# Patient Record
Sex: MALE | Race: OTHER | HISPANIC OR LATINO | Employment: OTHER | URBAN - METROPOLITAN AREA
[De-identification: names, ages, dates, MRNs, and addresses within clinical notes are randomized per-mention and may not be internally consistent; named-entity substitution may affect disease eponyms.]

---

## 2023-05-16 LAB
ALANINE AMINOTRANSFERASE (SGPT) (U/L) IN SER/PLAS: 15 U/L (ref 10–52)
ALBUMIN (G/DL) IN SER/PLAS: 4.4 G/DL (ref 3.4–5)
ALKALINE PHOSPHATASE (U/L) IN SER/PLAS: 71 U/L (ref 33–136)
ANION GAP IN SER/PLAS: 9 MMOL/L (ref 10–20)
ASPARTATE AMINOTRANSFERASE (SGOT) (U/L) IN SER/PLAS: 20 U/L (ref 9–39)
BASOPHILS (10*3/UL) IN BLOOD BY AUTOMATED COUNT: 0.01 X10E9/L (ref 0–0.1)
BASOPHILS/100 LEUKOCYTES IN BLOOD BY AUTOMATED COUNT: 0.2 % (ref 0–2)
BILIRUBIN TOTAL (MG/DL) IN SER/PLAS: 2 MG/DL (ref 0–1.2)
CALCIUM (MG/DL) IN SER/PLAS: 9.2 MG/DL (ref 8.6–10.3)
CARBON DIOXIDE, TOTAL (MMOL/L) IN SER/PLAS: 30 MMOL/L (ref 21–32)
CHLORIDE (MMOL/L) IN SER/PLAS: 102 MMOL/L (ref 98–107)
CHOLESTEROL (MG/DL) IN SER/PLAS: 124 MG/DL (ref 0–199)
CHOLESTEROL IN HDL (MG/DL) IN SER/PLAS: 41.4 MG/DL
CHOLESTEROL/HDL RATIO: 3
CREATININE (MG/DL) IN SER/PLAS: 0.95 MG/DL (ref 0.5–1.3)
EOSINOPHILS (10*3/UL) IN BLOOD BY AUTOMATED COUNT: 0.08 X10E9/L (ref 0–0.4)
EOSINOPHILS/100 LEUKOCYTES IN BLOOD BY AUTOMATED COUNT: 1.3 % (ref 0–6)
ERYTHROCYTE DISTRIBUTION WIDTH (RATIO) BY AUTOMATED COUNT: 12.9 % (ref 11.5–14.5)
ERYTHROCYTE MEAN CORPUSCULAR HEMOGLOBIN CONCENTRATION (G/DL) BY AUTOMATED: 34.1 G/DL (ref 32–36)
ERYTHROCYTE MEAN CORPUSCULAR VOLUME (FL) BY AUTOMATED COUNT: 87 FL (ref 80–100)
ERYTHROCYTES (10*6/UL) IN BLOOD BY AUTOMATED COUNT: 5.26 X10E12/L (ref 4.5–5.9)
GFR MALE: 85 ML/MIN/1.73M2
GLUCOSE (MG/DL) IN SER/PLAS: 103 MG/DL (ref 74–99)
HEMATOCRIT (%) IN BLOOD BY AUTOMATED COUNT: 45.8 % (ref 41–52)
HEMOGLOBIN (G/DL) IN BLOOD: 15.6 G/DL (ref 13.5–17.5)
IMMATURE GRANULOCYTES/100 LEUKOCYTES IN BLOOD BY AUTOMATED COUNT: 0.2 % (ref 0–0.9)
LDL: 60 MG/DL (ref 0–99)
LEUKOCYTES (10*3/UL) IN BLOOD BY AUTOMATED COUNT: 6.4 X10E9/L (ref 4.4–11.3)
LYMPHOCYTES (10*3/UL) IN BLOOD BY AUTOMATED COUNT: 1.97 X10E9/L (ref 0.8–3)
LYMPHOCYTES/100 LEUKOCYTES IN BLOOD BY AUTOMATED COUNT: 30.9 % (ref 13–44)
MONOCYTES (10*3/UL) IN BLOOD BY AUTOMATED COUNT: 0.55 X10E9/L (ref 0.05–0.8)
MONOCYTES/100 LEUKOCYTES IN BLOOD BY AUTOMATED COUNT: 8.6 % (ref 2–10)
NATRIURETIC PEPTIDE B (PG/ML) IN SER/PLAS: 29 PG/ML (ref 0–99)
NEUTROPHILS (10*3/UL) IN BLOOD BY AUTOMATED COUNT: 3.76 X10E9/L (ref 1.6–5.5)
NEUTROPHILS/100 LEUKOCYTES IN BLOOD BY AUTOMATED COUNT: 58.8 % (ref 40–80)
PLATELETS (10*3/UL) IN BLOOD AUTOMATED COUNT: 165 X10E9/L (ref 150–450)
POTASSIUM (MMOL/L) IN SER/PLAS: 4.3 MMOL/L (ref 3.5–5.3)
PROTEIN TOTAL: 7.2 G/DL (ref 6.4–8.2)
SODIUM (MMOL/L) IN SER/PLAS: 137 MMOL/L (ref 136–145)
THYROTROPIN (MIU/L) IN SER/PLAS BY DETECTION LIMIT <= 0.05 MIU/L: 5.07 MIU/L (ref 0.44–3.98)
THYROXINE (T4) FREE (NG/DL) IN SER/PLAS: 1.09 NG/DL (ref 0.61–1.12)
TRIGLYCERIDE (MG/DL) IN SER/PLAS: 114 MG/DL (ref 0–149)
UREA NITROGEN (MG/DL) IN SER/PLAS: 11 MG/DL (ref 6–23)
VLDL: 23 MG/DL (ref 0–40)

## 2023-10-02 PROBLEM — Z86.79 HISTORY OF ORTHOSTATIC HYPOTENSION: Status: ACTIVE | Noted: 2023-10-02

## 2023-10-02 PROBLEM — G20.A1 PARKINSONS DISEASE (MULTI): Status: ACTIVE | Noted: 2023-10-02

## 2023-10-02 PROBLEM — F32.5 DEPRESSION, MAJOR, IN REMISSION (CMS-HCC): Status: ACTIVE | Noted: 2023-10-02

## 2023-10-02 PROBLEM — K63.5 COLON POLYPS: Status: ACTIVE | Noted: 2023-10-02

## 2023-10-02 PROBLEM — R35.1 NOCTURIA: Status: ACTIVE | Noted: 2023-10-02

## 2023-10-02 PROBLEM — N40.1 BPH WITH OBSTRUCTION/LOWER URINARY TRACT SYMPTOMS: Status: ACTIVE | Noted: 2023-10-02

## 2023-10-02 PROBLEM — G47.30 SLEEP APNEA: Status: ACTIVE | Noted: 2023-10-02

## 2023-10-02 PROBLEM — Z78.9 NEVER SMOKED ANY SUBSTANCE: Status: ACTIVE | Noted: 2023-10-02

## 2023-10-02 PROBLEM — R00.1 SINUS BRADYCARDIA: Status: ACTIVE | Noted: 2023-10-02

## 2023-10-02 PROBLEM — E66.812 CLASS 2 OBESITY WITH ALVEOLAR HYPOVENTILATION AND BODY MASS INDEX (BMI) OF 36.0 TO 36.9 IN ADULT: Status: ACTIVE | Noted: 2023-10-02

## 2023-10-02 PROBLEM — H91.90 HEARING LOSS: Status: ACTIVE | Noted: 2023-10-02

## 2023-10-02 PROBLEM — R07.89 ATYPICAL CHEST PAIN: Status: ACTIVE | Noted: 2023-10-02

## 2023-10-02 PROBLEM — E66.2 CLASS 2 OBESITY WITH ALVEOLAR HYPOVENTILATION AND BODY MASS INDEX (BMI) OF 37.0 TO 37.9 IN ADULT (MULTI): Status: ACTIVE | Noted: 2023-10-02

## 2023-10-02 PROBLEM — E80.6 HYPERBILIRUBINEMIA: Status: ACTIVE | Noted: 2023-10-02

## 2023-10-02 PROBLEM — E03.9 HYPOTHYROIDISM: Status: ACTIVE | Noted: 2023-10-02

## 2023-10-02 PROBLEM — N13.8 BPH WITH OBSTRUCTION/LOWER URINARY TRACT SYMPTOMS: Status: ACTIVE | Noted: 2023-10-02

## 2023-10-02 PROBLEM — E66.2 CLASS 2 OBESITY WITH ALVEOLAR HYPOVENTILATION AND BODY MASS INDEX (BMI) OF 35.0 TO 35.9 IN ADULT (MULTI): Status: ACTIVE | Noted: 2023-10-02

## 2023-10-02 PROBLEM — E04.1 SOLITARY THYROID NODULE: Status: ACTIVE | Noted: 2023-10-02

## 2023-10-02 PROBLEM — E78.5 HYPERLIPIDEMIA: Status: ACTIVE | Noted: 2023-10-02

## 2023-10-02 PROBLEM — I73.9 INTERMITTENT CLAUDICATION (CMS-HCC): Status: ACTIVE | Noted: 2023-10-02

## 2023-10-02 PROBLEM — E66.2 CLASS 2 OBESITY WITH ALVEOLAR HYPOVENTILATION AND BODY MASS INDEX (BMI) OF 36.0 TO 36.9 IN ADULT (MULTI): Status: ACTIVE | Noted: 2023-10-02

## 2023-10-02 PROBLEM — I87.2 CHRONIC VENOUS INSUFFICIENCY: Status: ACTIVE | Noted: 2023-10-02

## 2023-10-02 PROBLEM — E55.9 VITAMIN D DEFICIENCY: Status: ACTIVE | Noted: 2023-10-02

## 2023-10-02 PROBLEM — R06.02 SHORTNESS OF BREATH: Status: ACTIVE | Noted: 2023-10-02

## 2023-10-02 PROBLEM — R73.9 HYPERGLYCEMIA: Status: ACTIVE | Noted: 2023-10-02

## 2023-10-02 PROBLEM — E66.812 CLASS 2 OBESITY WITH ALVEOLAR HYPOVENTILATION AND BODY MASS INDEX (BMI) OF 37.0 TO 37.9 IN ADULT: Status: ACTIVE | Noted: 2023-10-02

## 2023-10-02 PROBLEM — R31.29 MICROSCOPIC HEMATURIA: Status: ACTIVE | Noted: 2023-10-02

## 2023-10-02 PROBLEM — E66.812 CLASS 2 OBESITY WITH ALVEOLAR HYPOVENTILATION AND BODY MASS INDEX (BMI) OF 35.0 TO 35.9 IN ADULT: Status: ACTIVE | Noted: 2023-10-02

## 2023-10-02 PROBLEM — I10 HTN (HYPERTENSION): Status: ACTIVE | Noted: 2023-10-02

## 2023-10-02 PROBLEM — R60.0 EDEMA, LEG: Status: ACTIVE | Noted: 2023-10-02

## 2023-10-02 PROBLEM — D69.6 THROMBOCYTOPENIA (CMS-HCC): Status: ACTIVE | Noted: 2023-10-02

## 2023-10-02 PROBLEM — G62.9 PERIPHERAL POLYNEUROPATHY: Status: ACTIVE | Noted: 2023-10-02

## 2023-10-02 RX ORDER — POTASSIUM CHLORIDE 20 MEQ/1
1 TABLET, EXTENDED RELEASE ORAL DAILY
COMMUNITY
Start: 2021-02-12 | End: 2024-01-15

## 2023-10-02 RX ORDER — LEVOTHYROXINE SODIUM 25 UG/1
1 TABLET ORAL DAILY
COMMUNITY
Start: 2022-03-04 | End: 2024-02-14 | Stop reason: SDUPTHER

## 2023-10-02 RX ORDER — FLUTICASONE PROPIONATE 50 MCG
2 SPRAY, SUSPENSION (ML) NASAL DAILY
COMMUNITY
Start: 2017-11-16

## 2023-10-02 RX ORDER — SERTRALINE HYDROCHLORIDE 25 MG/1
1 TABLET, FILM COATED ORAL 2 TIMES DAILY
COMMUNITY

## 2023-10-02 RX ORDER — ERGOCALCIFEROL 1.25 MG/1
1 CAPSULE ORAL
COMMUNITY
Start: 2022-03-04 | End: 2024-02-14 | Stop reason: SDUPTHER

## 2023-10-02 RX ORDER — NITROGLYCERIN 0.4 MG/1
1 TABLET SUBLINGUAL EVERY 5 MIN PRN
COMMUNITY

## 2023-10-02 RX ORDER — CARBIDOPA AND LEVODOPA 25; 100 MG/1; MG/1
1.5 TABLET ORAL 4 TIMES DAILY
COMMUNITY
Start: 2021-06-17

## 2023-10-02 RX ORDER — AMLODIPINE BESYLATE 2.5 MG/1
1 TABLET ORAL DAILY
COMMUNITY
Start: 2022-04-11 | End: 2024-02-14 | Stop reason: SDUPTHER

## 2023-10-02 RX ORDER — AMLODIPINE BESYLATE 5 MG/1
1 TABLET ORAL DAILY
COMMUNITY
End: 2024-02-14 | Stop reason: ALTCHOICE

## 2023-10-02 RX ORDER — TAMSULOSIN HYDROCHLORIDE 0.4 MG/1
0.4 CAPSULE ORAL DAILY
COMMUNITY
End: 2024-05-13 | Stop reason: SDUPTHER

## 2023-10-02 RX ORDER — FUROSEMIDE 40 MG/1
1 TABLET ORAL DAILY
COMMUNITY
Start: 2022-01-12 | End: 2024-02-14 | Stop reason: ALTCHOICE

## 2023-10-02 RX ORDER — ROSUVASTATIN CALCIUM 10 MG/1
1 TABLET, COATED ORAL NIGHTLY
COMMUNITY
Start: 2016-02-12

## 2023-10-02 RX ORDER — LISINOPRIL 20 MG/1
1 TABLET ORAL DAILY
COMMUNITY
Start: 2018-06-18 | End: 2024-02-14 | Stop reason: SDUPTHER

## 2023-10-04 ENCOUNTER — ANCILLARY PROCEDURE (OUTPATIENT)
Dept: CARDIOLOGY | Facility: CLINIC | Age: 73
End: 2023-10-04
Payer: MEDICARE

## 2023-10-04 ENCOUNTER — LAB (OUTPATIENT)
Dept: LAB | Facility: LAB | Age: 73
End: 2023-10-04
Payer: MEDICARE

## 2023-10-04 DIAGNOSIS — R00.1 BRADYCARDIA, UNSPECIFIED: ICD-10-CM

## 2023-10-04 DIAGNOSIS — E03.9 HYPOTHYROIDISM, UNSPECIFIED: Primary | ICD-10-CM

## 2023-10-04 LAB — TSH SERPL-ACNC: 2.13 MIU/L (ref 0.44–3.98)

## 2023-10-04 PROCEDURE — 93224 XTRNL ECG REC UP TO 48 HRS: CPT | Performed by: INTERNAL MEDICINE

## 2023-10-04 PROCEDURE — 36415 COLL VENOUS BLD VENIPUNCTURE: CPT

## 2023-10-05 ENCOUNTER — TELEPHONE (OUTPATIENT)
Dept: PRIMARY CARE | Facility: CLINIC | Age: 73
End: 2023-10-05
Payer: MEDICARE

## 2024-02-14 ENCOUNTER — OFFICE VISIT (OUTPATIENT)
Dept: PRIMARY CARE | Facility: CLINIC | Age: 74
End: 2024-02-14
Payer: MEDICARE

## 2024-02-14 VITALS
HEART RATE: 45 BPM | HEIGHT: 67 IN | WEIGHT: 229 LBS | DIASTOLIC BLOOD PRESSURE: 69 MMHG | SYSTOLIC BLOOD PRESSURE: 126 MMHG | TEMPERATURE: 98 F | BODY MASS INDEX: 35.94 KG/M2

## 2024-02-14 DIAGNOSIS — M54.42 ACUTE LEFT-SIDED LOW BACK PAIN WITH LEFT-SIDED SCIATICA: Primary | ICD-10-CM

## 2024-02-14 DIAGNOSIS — N40.1 BPH WITH OBSTRUCTION/LOWER URINARY TRACT SYMPTOMS: ICD-10-CM

## 2024-02-14 DIAGNOSIS — I10 PRIMARY HYPERTENSION: ICD-10-CM

## 2024-02-14 DIAGNOSIS — R31.29 MICROSCOPIC HEMATURIA: ICD-10-CM

## 2024-02-14 DIAGNOSIS — E06.3 HYPOTHYROIDISM DUE TO HASHIMOTO'S THYROIDITIS: ICD-10-CM

## 2024-02-14 DIAGNOSIS — E78.2 MIXED HYPERLIPIDEMIA: ICD-10-CM

## 2024-02-14 DIAGNOSIS — N13.8 BPH WITH OBSTRUCTION/LOWER URINARY TRACT SYMPTOMS: ICD-10-CM

## 2024-02-14 DIAGNOSIS — E03.8 HYPOTHYROIDISM DUE TO HASHIMOTO'S THYROIDITIS: ICD-10-CM

## 2024-02-14 DIAGNOSIS — R35.1 NOCTURIA: ICD-10-CM

## 2024-02-14 DIAGNOSIS — E55.9 VITAMIN D DEFICIENCY: ICD-10-CM

## 2024-02-14 DIAGNOSIS — Z12.5 PROSTATE CANCER SCREENING: ICD-10-CM

## 2024-02-14 PROBLEM — M65.979 TENOSYNOVITIS OF ANKLE: Status: ACTIVE | Noted: 2024-02-14

## 2024-02-14 PROBLEM — R73.02 IMPAIRED GLUCOSE TOLERANCE (ORAL): Status: ACTIVE | Noted: 2024-02-14

## 2024-02-14 PROBLEM — M21.40 PES PLANUS: Status: ACTIVE | Noted: 2024-02-14

## 2024-02-14 PROBLEM — M54.50 LOW BACK PAIN, UNSPECIFIED: Status: ACTIVE | Noted: 2024-02-14

## 2024-02-14 PROBLEM — R26.9 UNSPECIFIED ABNORMALITIES OF GAIT AND MOBILITY: Status: ACTIVE | Noted: 2024-02-14

## 2024-02-14 PROBLEM — R60.0 LOCALIZED EDEMA: Status: ACTIVE | Noted: 2024-02-14

## 2024-02-14 PROBLEM — F51.01 PRIMARY INSOMNIA: Status: ACTIVE | Noted: 2024-02-14

## 2024-02-14 PROBLEM — L21.9 SEBORRHEIC DERMATITIS, UNSPECIFIED: Status: ACTIVE | Noted: 2024-02-14

## 2024-02-14 PROBLEM — G47.61 PERIODIC LIMB MOVEMENT DISORDER: Status: ACTIVE | Noted: 2024-02-14

## 2024-02-14 PROBLEM — M75.41 IMPINGEMENT SYNDROME OF RIGHT SHOULDER: Status: ACTIVE | Noted: 2024-02-14

## 2024-02-14 PROBLEM — M14.80: Status: ACTIVE | Noted: 2024-02-14

## 2024-02-14 PROBLEM — E87.6 HYPOKALEMIA: Status: ACTIVE | Noted: 2024-02-14

## 2024-02-14 PROBLEM — Z20.822 CONTACT WITH AND (SUSPECTED) EXPOSURE TO COVID-19: Status: ACTIVE | Noted: 2024-02-14

## 2024-02-14 PROBLEM — F33.2 MAJOR DEPRESSIVE DISORDER, RECURRENT SEVERE WITHOUT PSYCHOTIC FEATURES (MULTI): Status: ACTIVE | Noted: 2024-02-14

## 2024-02-14 PROBLEM — H02.051 TRICHIASIS WITHOUT ENTROPION RIGHT UPPER EYELID: Status: ACTIVE | Noted: 2024-02-14

## 2024-02-14 PROBLEM — G90.9 AUTONOMIC NEUROPATHY: Status: ACTIVE | Noted: 2024-02-14

## 2024-02-14 PROBLEM — L29.9 PRURITUS, UNSPECIFIED: Status: ACTIVE | Noted: 2024-02-14

## 2024-02-14 PROBLEM — M65.9 TENOSYNOVITIS OF ANKLE: Status: ACTIVE | Noted: 2024-02-14

## 2024-02-14 PROBLEM — E11.49 TYPE 2 DIABETES MELLITUS WITH OTHER DIABETIC NEUROLOGICAL COMPLICATION (MULTI): Status: ACTIVE | Noted: 2024-02-14

## 2024-02-14 PROBLEM — I25.10 ARTERIOSCLEROSIS OF CORONARY ARTERY: Status: ACTIVE | Noted: 2024-02-14

## 2024-02-14 PROBLEM — M65.871 OTHER SYNOVITIS AND TENOSYNOVITIS, RIGHT ANKLE AND FOOT: Status: ACTIVE | Noted: 2024-02-14

## 2024-02-14 PROBLEM — H11.823 CONJUNCTIVOCHALASIS, BILATERAL: Status: ACTIVE | Noted: 2024-02-14

## 2024-02-14 PROBLEM — Q66.50 CONGENITAL PES PLANUS, UNSPECIFIED FOOT: Status: ACTIVE | Noted: 2024-02-14

## 2024-02-14 PROBLEM — R73.03 PREDIABETES: Status: ACTIVE | Noted: 2024-02-14

## 2024-02-14 PROBLEM — F41.9 ANXIETY DISORDER, UNSPECIFIED: Status: ACTIVE | Noted: 2024-02-14

## 2024-02-14 PROBLEM — B35.1 TINEA UNGUIUM: Status: ACTIVE | Noted: 2024-02-14

## 2024-02-14 PROBLEM — R60.0 BILATERAL LOWER EXTREMITY EDEMA: Status: ACTIVE | Noted: 2024-02-14

## 2024-02-14 PROCEDURE — 99214 OFFICE O/P EST MOD 30 MIN: CPT | Performed by: INTERNAL MEDICINE

## 2024-02-14 PROCEDURE — 1159F MED LIST DOCD IN RCRD: CPT | Performed by: INTERNAL MEDICINE

## 2024-02-14 PROCEDURE — 3074F SYST BP LT 130 MM HG: CPT | Performed by: INTERNAL MEDICINE

## 2024-02-14 PROCEDURE — 1036F TOBACCO NON-USER: CPT | Performed by: INTERNAL MEDICINE

## 2024-02-14 PROCEDURE — 96372 THER/PROPH/DIAG INJ SC/IM: CPT | Performed by: INTERNAL MEDICINE

## 2024-02-14 PROCEDURE — 1160F RVW MEDS BY RX/DR IN RCRD: CPT | Performed by: INTERNAL MEDICINE

## 2024-02-14 PROCEDURE — 4010F ACE/ARB THERAPY RXD/TAKEN: CPT | Performed by: INTERNAL MEDICINE

## 2024-02-14 PROCEDURE — 3078F DIAST BP <80 MM HG: CPT | Performed by: INTERNAL MEDICINE

## 2024-02-14 RX ORDER — METHYLPREDNISOLONE ACETATE 80 MG/ML
120 INJECTION, SUSPENSION INTRA-ARTICULAR; INTRALESIONAL; INTRAMUSCULAR; SOFT TISSUE ONCE
Qty: 1.5 ML | Refills: 0 | Status: SHIPPED | OUTPATIENT
Start: 2024-02-14 | End: 2024-02-14

## 2024-02-14 RX ORDER — ERGOCALCIFEROL 1.25 MG/1
1 CAPSULE ORAL
Qty: 12 CAPSULE | Refills: 3 | Status: SHIPPED | OUTPATIENT
Start: 2024-02-14 | End: 2025-02-13

## 2024-02-14 RX ORDER — LISINOPRIL 20 MG/1
20 TABLET ORAL DAILY
Qty: 90 TABLET | Refills: 3 | Status: SHIPPED | OUTPATIENT
Start: 2024-02-14 | End: 2025-02-13

## 2024-02-14 RX ORDER — AMLODIPINE BESYLATE 2.5 MG/1
2.5 TABLET ORAL DAILY
Qty: 90 TABLET | Refills: 3 | Status: SHIPPED | OUTPATIENT
Start: 2024-02-14 | End: 2025-02-13

## 2024-02-14 RX ORDER — ASPIRIN 81 MG/1
81 TABLET ORAL EVERY OTHER DAY
COMMUNITY

## 2024-02-14 RX ORDER — METHYLPREDNISOLONE ACETATE 80 MG/ML
120 INJECTION, SUSPENSION INTRA-ARTICULAR; INTRALESIONAL; INTRAMUSCULAR; SOFT TISSUE ONCE
Status: COMPLETED | OUTPATIENT
Start: 2024-02-14 | End: 2024-02-14

## 2024-02-14 RX ORDER — LEVOTHYROXINE SODIUM 25 UG/1
25 TABLET ORAL DAILY
Qty: 90 TABLET | Refills: 3 | Status: SHIPPED | OUTPATIENT
Start: 2024-02-14 | End: 2025-02-13

## 2024-02-14 RX ADMIN — METHYLPREDNISOLONE ACETATE 120 MG: 80 INJECTION, SUSPENSION INTRA-ARTICULAR; INTRALESIONAL; INTRAMUSCULAR; SOFT TISSUE at 12:43

## 2024-02-14 ASSESSMENT — PATIENT HEALTH QUESTIONNAIRE - PHQ9
SUM OF ALL RESPONSES TO PHQ9 QUESTIONS 1 AND 2: 0
2. FEELING DOWN, DEPRESSED OR HOPELESS: NOT AT ALL
1. LITTLE INTEREST OR PLEASURE IN DOING THINGS: NOT AT ALL

## 2024-02-14 ASSESSMENT — ENCOUNTER SYMPTOMS
OCCASIONAL FEELINGS OF UNSTEADINESS: 0
LOSS OF SENSATION IN FEET: 0
DEPRESSION: 0

## 2024-02-14 NOTE — PROGRESS NOTES
"Subjective   Patient ID: Patricio Mae is a 73 y.o. male who presents for Establish Care and Back Pain (Low back radiating ).    HPI Patient presents to the office with the chief complaint of left sided low back pain.  This is a recurrent issue.  This started 2 weeks ago. It happened 1 year ago and went to the ER and he was treated with %5 lidocaine patches. Pain is sharp pain and difficulty walk.  Denies falls or injury.     Review of Systems    Objective   /69   Pulse (!) 45   Temp 36.7 °C (98 °F) (Temporal)   Ht 1.702 m (5' 7\")   Wt 104 kg (229 lb)   BMI 35.87 kg/m²     Physical Exam    Assessment/Plan   Problem List Items Addressed This Visit             ICD-10-CM    BPH with obstruction/lower urinary tract symptoms N40.1, N13.8    Relevant Orders    Urinalysis with Reflex Microscopic    Referral to Urology    HTN (hypertension) I10    Relevant Medications    amLODIPine (Norvasc) 2.5 mg tablet    lisinopril 20 mg tablet    Other Relevant Orders    CBC and Auto Differential    Comprehensive metabolic panel    Hyperlipidemia E78.5    Relevant Orders    CBC and Auto Differential    Comprehensive metabolic panel    Lipid panel    Hypothyroidism E03.9    Relevant Medications    levothyroxine (Synthroid, Levoxyl) 25 mcg tablet    Other Relevant Orders    TSH with reflex to Free T4 if abnormal    Microscopic hematuria R31.29    Relevant Orders    Urinalysis with Reflex Microscopic    Nocturia R35.1    Relevant Orders    Urinalysis with Reflex Microscopic    Referral to Urology    Vitamin D deficiency E55.9    Relevant Medications    ergocalciferol (Vitamin D-2) 1.25 MG (99491 UT) capsule    Other Relevant Orders    Vitamin D 25-Hydroxy,Total (for eval of Vitamin D levels)    Low back pain, unspecified - Primary M54.50    Relevant Orders    XR lumbar spine 2-3 views (Completed)     Other Visit Diagnoses         Codes    Prostate cancer screening     Z12.5    Relevant Orders    Prostate Spec.Ag,Screen      "

## 2024-02-15 ENCOUNTER — HOSPITAL ENCOUNTER (OUTPATIENT)
Dept: RADIOLOGY | Facility: CLINIC | Age: 74
Discharge: HOME | End: 2024-02-15
Payer: MEDICARE

## 2024-02-15 DIAGNOSIS — M54.42 ACUTE LEFT-SIDED LOW BACK PAIN WITH LEFT-SIDED SCIATICA: ICD-10-CM

## 2024-02-15 PROCEDURE — 72100 X-RAY EXAM L-S SPINE 2/3 VWS: CPT | Performed by: STUDENT IN AN ORGANIZED HEALTH CARE EDUCATION/TRAINING PROGRAM

## 2024-02-15 PROCEDURE — 72100 X-RAY EXAM L-S SPINE 2/3 VWS: CPT

## 2024-02-27 ENCOUNTER — OFFICE VISIT (OUTPATIENT)
Dept: CARDIOLOGY | Facility: CLINIC | Age: 74
End: 2024-02-27
Payer: MEDICARE

## 2024-02-27 VITALS
WEIGHT: 224 LBS | TEMPERATURE: 97.2 F | BODY MASS INDEX: 35.16 KG/M2 | SYSTOLIC BLOOD PRESSURE: 104 MMHG | HEIGHT: 67 IN | DIASTOLIC BLOOD PRESSURE: 70 MMHG | HEART RATE: 55 BPM

## 2024-02-27 DIAGNOSIS — I25.10 CORONARY ARTERY CALCIFICATION SEEN ON CT SCAN: ICD-10-CM

## 2024-02-27 DIAGNOSIS — Z86.79 HISTORY OF ORTHOSTATIC HYPOTENSION: ICD-10-CM

## 2024-02-27 DIAGNOSIS — Z98.890 HISTORY OF CARDIAC CATH: ICD-10-CM

## 2024-02-27 DIAGNOSIS — I10 PRIMARY HYPERTENSION: Primary | ICD-10-CM

## 2024-02-27 DIAGNOSIS — R00.1 SINUS BRADYCARDIA: ICD-10-CM

## 2024-02-27 DIAGNOSIS — E78.2 MIXED HYPERLIPIDEMIA: ICD-10-CM

## 2024-02-27 DIAGNOSIS — Z78.9 NEVER SMOKED ANY SUBSTANCE: ICD-10-CM

## 2024-02-27 PROCEDURE — 99214 OFFICE O/P EST MOD 30 MIN: CPT | Performed by: INTERNAL MEDICINE

## 2024-02-27 PROCEDURE — 1159F MED LIST DOCD IN RCRD: CPT | Performed by: INTERNAL MEDICINE

## 2024-02-27 PROCEDURE — 3078F DIAST BP <80 MM HG: CPT | Performed by: INTERNAL MEDICINE

## 2024-02-27 PROCEDURE — 1036F TOBACCO NON-USER: CPT | Performed by: INTERNAL MEDICINE

## 2024-02-27 PROCEDURE — 1160F RVW MEDS BY RX/DR IN RCRD: CPT | Performed by: INTERNAL MEDICINE

## 2024-02-27 PROCEDURE — 4010F ACE/ARB THERAPY RXD/TAKEN: CPT | Performed by: INTERNAL MEDICINE

## 2024-02-27 PROCEDURE — 3074F SYST BP LT 130 MM HG: CPT | Performed by: INTERNAL MEDICINE

## 2024-02-27 ASSESSMENT — ENCOUNTER SYMPTOMS
WHEEZING: 0
HEMATOLOGIC/LYMPHATIC NEGATIVE: 1
TREMORS: 1
FATIGUE: 1
DIFFICULTY URINATING: 1
LIGHT-HEADEDNESS: 1
STRIDOR: 0
HEMATURIA: 0
SHORTNESS OF BREATH: 1
COUGH: 0
DYSURIA: 0
PALPITATIONS: 0
CONFUSION: 1
ARTHRALGIAS: 1
ABDOMINAL DISTENTION: 1
ACTIVITY CHANGE: 1

## 2024-02-27 ASSESSMENT — PATIENT HEALTH QUESTIONNAIRE - PHQ9
1. LITTLE INTEREST OR PLEASURE IN DOING THINGS: NOT AT ALL
SUM OF ALL RESPONSES TO PHQ9 QUESTIONS 1 AND 2: 0
2. FEELING DOWN, DEPRESSED OR HOPELESS: NOT AT ALL

## 2024-02-27 NOTE — PROGRESS NOTES
Patient:  Patricio Mae  YOB: 1950  MRN: [id number]       Chief Complaint/Active Symptoms:       Patricio Mae is a 73 y.o. male who returns today for cardiac follow-up.    Here for routine follow-up. No chest pain or discomfort, No palpitations. Has occasional dizziness wityh standing. No edema. Has chronic TAVERA without orthopnea of PND.     [facility]. Had holter monitor in September with normal chronotropic competence - no indication for pacer.     Review of Systems   Constitutional:  Positive for activity change and fatigue.   HENT:  Positive for hearing loss.    Respiratory:  Positive for shortness of breath. Negative for cough, wheezing and stridor.    Cardiovascular:  Negative for chest pain, palpitations and leg swelling.   Gastrointestinal:  Positive for abdominal distention.   Genitourinary:  Positive for difficulty urinating. Negative for dysuria and hematuria.   Musculoskeletal:  Positive for arthralgias.   Neurological:  Positive for tremors and light-headedness. Negative for syncope.   Hematological: Negative.    Psychiatric/Behavioral:  Positive for confusion.    All other systems reviewed and are negative.      Objective:     Vitals:    02/27/24 1044   BP: 104/70   Pulse: 55   Temp: 36.2 °C (97.2 °F)       Vitals:    02/27/24 1044   Weight: 102 kg (224 lb)       Allergies:     Allergies   Allergen Reactions    Cefuroxime Axetil Unknown    Hydrocortisone-Iodoquinol-Aloe Unknown    Metoprolol Unknown    Penicillins Unknown    Simvastatin Unknown          Medications:     Current Outpatient Medications   Medication Instructions    amLODIPine (NORVASC) 2.5 mg, oral, Daily    aspirin 81 mg, oral, Every other day    carbidopa-levodopa (Sinemet)  mg tablet 1.5 tablets, oral, 4 times daily    cetirizine (ZyrTEC) 10 mg capsule 1 capsule, oral, Daily    ergocalciferol (VITAMIN D-2) 1,250 mcg, oral, Weekly    fluticasone (Flonase) 50 mcg/actuation nasal spray 2 sprays,  "nasal, Daily    levothyroxine (SYNTHROID, LEVOXYL) 25 mcg, oral, Daily    lisinopril 20 mg, oral, Daily    nitroglycerin (Nitrostat) 0.4 mg SL tablet 1 tablet, sublingual, Every 5 min PRN    NON FORMULARY CLARITIN    potassium chloride CR 20 mEq ER tablet 20 mEq, oral, Daily    rosuvastatin (Crestor) 10 mg tablet 1 tablet, oral, Nightly    sertraline (Zoloft) 25 mg tablet 1 tablet, oral, 2 times daily    tamsulosin (FLOMAX) 0.4 mg, oral, Daily       Physical Examination:   GENERAL:  Well developed, well nourished, in no acute distress.  HEENT: NC AT, EOMI with anicteric sclera  NECK:  Supple, no JVD, no bruit.  CHEST:  Symmetric and nontender.  LUNGS:  Clear to auscultation bilaterally, normal respiratory effort.  HEART:  PMI is nondisplaced. RRR with normal S1 and S2, no S3, no mumur or rub. No carotid or abdominal bruits  ABDOMEN: Soft, NT, ND without palpable organomegaly or bruits  EXTREMITIES:  Warm with good color, no clubbing or cyanosis.  There is no edema noted.  PERIPHERAL VASCULAR:  Pulses present and equally palpable; 2+ throughout.  MUSCULOSKELETAL: Mild osteoarthritic changes  NEURO/PSYCH:  Alert and oriented times three with approppriate behavior and responses. Nonfocal motor examination with normal gait and ambulation; tremor present, memory clearly impaired  Lymph: No significant palpable lymphadenopathy  Skin: no rash or lesions on exposed skin or reported.    Lab:     CBC:   Lab Results   Component Value Date    WBC 6.4 05/16/2023    RBC 5.26 05/16/2023    HGB 15.6 05/16/2023    HCT 45.8 05/16/2023     05/16/2023        CMP:    Lab Results   Component Value Date     05/16/2023    K 4.3 05/16/2023     05/16/2023    CO2 30 05/16/2023    BUN 11 05/16/2023    CREATININE 0.95 05/16/2023    GLUCOSE 103 (H) 05/16/2023    CALCIUM 9.2 05/16/2023       Magnesium:    No results found for: \"MG\"    Lipid Profile:    Lab Results   Component Value Date    TRIG 114 05/16/2023    HDL 41.4 " "05/16/2023       TSH:    Lab Results   Component Value Date    TSH 2.13 10/04/2023       BNP:   Lab Results   Component Value Date    BNP 29 05/16/2023        PT/INR:    No results found for: \"PROTIME\", \"INR\"    HgBA1c:    No results found for: \"HGBA1C\"    BMP:  Lab Results   Component Value Date     05/16/2023     04/28/2022     02/28/2022    K 4.3 05/16/2023    K 4.3 04/28/2022    K 4.0 02/28/2022     05/16/2023     04/28/2022     02/28/2022    CO2 30 05/16/2023    CO2 31 04/28/2022    CO2 30 02/28/2022    BUN 11 05/16/2023    BUN 13 04/28/2022    BUN 15 02/28/2022    CREATININE 0.95 05/16/2023    CREATININE 0.98 04/28/2022    CREATININE 0.99 02/28/2022       Cardiac Enzymes:    Lab Results   Component Value Date    TROPHS 9 04/28/2022    TROPHS 7 04/28/2022       Hepatic Function Panel:    Lab Results   Component Value Date    ALKPHOS 71 05/16/2023    ALT 15 05/16/2023    AST 20 05/16/2023    PROT 7.2 05/16/2023    BILITOT 2.0 (H) 05/16/2023         Diagnostic Studies:     No echocardiogram results found for the past 12 months    No nuclear medicine results found for the past 12 months    No valid procedures specified.    EKG:   No results found for: \"EKG\"    Radiology:     No orders to display     ASSESSMENT     Problem List Items Addressed This Visit       HTN (hypertension) - Primary    Hyperlipidemia    Sinus bradycardia    History of orthostatic hypotension    Never smoked any substance    Coronary artery calcification seen on CT scan    History of cardiac cath       PLAN     1.  Coronary calcification seen on CT scan.  History of cardiac catheterization The patient has had a cardiovascular workup in the past.  In December 2016 with a history of chronic chest pain he finally underwent a heart catheterization at  that showed normal coronary arteries and normal LV function.  Unfortunately with all of our records changes that heart catheterization has " not been transferred forward to maintain in his current chart.  Notes from the time that I saw him are available and do list that finding.  Given this I do not see any reason in the absence of any new concerning symptoms for any type of risk stratification with stress testing until at least 2026.  Will continue with risk factor modification.  2.  Sinus bradycardia.  The patient has asymptomatic sinus bradycardia.  He had a Holter monitor last September that showed while he had an average heart rate in the 50s he had normal chronotropic competence and no high degree AV block or profound bradycardia.  As of this time there is no indication for pacemaker but I would avoid any medications that would slow heart rate further such as beta-blockers.  3.  History of orthostatic hypotension.  The patient continues to have episodes of orthostatic dizziness.  I would recommend to remain hydrated and would watch his prostate affected medications.  4.  Hypertension.  Blood pressures are fairly aggressively controlled today.  Would avoid hypotension given his history of orthostatic hypotension.  If his blood pressures remain this low I would discontinue his amlodipine and then if still hypotensive consider decreasing the dose of his lisinopril.  5.  Hyperlipidemia.  Continue statin therapy.  6.  Tobacco and weight status.  The patient is a non-smoker but BMI of 35.  We have encouraged heart healthy weight reduction diet.    Will see the patient in follow-up in a years time sooner if there is any new problems or concerns.

## 2024-03-22 ENCOUNTER — TELEPHONE (OUTPATIENT)
Dept: PRIMARY CARE | Facility: CLINIC | Age: 74
End: 2024-03-22
Payer: MEDICARE

## 2024-03-24 DIAGNOSIS — M47.816 LUMBAR SPONDYLOSIS: ICD-10-CM

## 2024-03-24 DIAGNOSIS — M54.50 RECURRENT LOW BACK PAIN: Primary | ICD-10-CM

## 2024-03-25 ENCOUNTER — TELEPHONE (OUTPATIENT)
Dept: PRIMARY CARE | Facility: CLINIC | Age: 74
End: 2024-03-25
Payer: MEDICARE

## 2024-03-25 NOTE — TELEPHONE ENCOUNTER
----- Message from Meir Dodson DO sent at 3/24/2024 11:51 PM EDT -----  Referral to orthospine Dr. Cyr was placed.

## 2024-04-18 ENCOUNTER — HOSPITAL ENCOUNTER (OUTPATIENT)
Dept: RADIOLOGY | Facility: CLINIC | Age: 74
Discharge: HOME | End: 2024-04-18
Payer: MEDICARE

## 2024-04-18 ENCOUNTER — OFFICE VISIT (OUTPATIENT)
Dept: ORTHOPEDIC SURGERY | Facility: CLINIC | Age: 74
End: 2024-04-18
Payer: MEDICARE

## 2024-04-18 DIAGNOSIS — M54.50 LOW BACK PAIN, UNSPECIFIED BACK PAIN LATERALITY, UNSPECIFIED CHRONICITY, UNSPECIFIED WHETHER SCIATICA PRESENT: ICD-10-CM

## 2024-04-18 DIAGNOSIS — M54.16 LUMBAR RADICULITIS: ICD-10-CM

## 2024-04-18 DIAGNOSIS — M51.36 LUMBAR DEGENERATIVE DISC DISEASE: ICD-10-CM

## 2024-04-18 DIAGNOSIS — M54.50 LOW BACK PAIN, UNSPECIFIED BACK PAIN LATERALITY, UNSPECIFIED CHRONICITY, UNSPECIFIED WHETHER SCIATICA PRESENT: Primary | ICD-10-CM

## 2024-04-18 PROCEDURE — 1160F RVW MEDS BY RX/DR IN RCRD: CPT | Performed by: ORTHOPAEDIC SURGERY

## 2024-04-18 PROCEDURE — 1159F MED LIST DOCD IN RCRD: CPT | Performed by: ORTHOPAEDIC SURGERY

## 2024-04-18 PROCEDURE — 99204 OFFICE O/P NEW MOD 45 MIN: CPT | Performed by: ORTHOPAEDIC SURGERY

## 2024-04-18 PROCEDURE — 99214 OFFICE O/P EST MOD 30 MIN: CPT | Performed by: ORTHOPAEDIC SURGERY

## 2024-04-18 PROCEDURE — 4010F ACE/ARB THERAPY RXD/TAKEN: CPT | Performed by: ORTHOPAEDIC SURGERY

## 2024-04-18 PROCEDURE — 72100 X-RAY EXAM L-S SPINE 2/3 VWS: CPT

## 2024-04-18 NOTE — PROGRESS NOTES
Patricio Mae is a 73 y.o. male who presents for Pain and New Patient Visit of the Lower Back (Dr. Dodson PCP Referral - LBP, Left Hip Pain - Flex / Exten Views Today, AP / LAT 2/15/24 - Prior Lt Hip Intramuscular DEPO-Medrol Inj 2/14/24 w/ PCP and prior PT which has helped, but pain returns ).    HPI:  73-year-old gentleman here for new patient evaluation of low back pain left hip pain.  Sent by Dr. Dodson primary care.  He denies any fever chills nausea vomiting night sweats.  He has no bowel or bladder complaints.    Physical exam:  Well-nourished, well kept.No lymphangitis or lymphadenopathy in the examined extremities.  Gait normal.  Can stand on heels and toes.   Examination of the back shows tenderness in the paraspinous musculature the left lumbosacral area.  There is minimally decreased range of motion in all directions due to guarding/muscle spasms and pain at extremes.  There is good strength and no instability.  Examination of the lower extremities reveals no point tenderness, swelling, or deformity.  Range of motion of the hips, knees, and ankles are full without crepitance, instability, or exacerbation of pain except internal/external rotation of his left leg reproduces nearly 100% of his left low back and hip pain.  Strength is 5/5 throughout.  No redness, abrasions, or lesions on extremities  Gross sensation intact in the extremities.  Deep tendon reflexes absent bilateral patella. Clonus negative.  Affect normal.  Alert and oriented ×3.  Coordination normal.    Imaging studies:  We ordered and reviewed flexion and extension x-rays of the lumbar spine today.  We reviewed AP lateral x-rays of the lumbar spine from February 15.    Assessment:  73-year-old gentleman here for evaluation of low back pain and left hip and leg pain, sent by his primary care doctor, Dr. Dodson.  This is been going on for at least a year.  He describes pain starting in the left lumbosacral area radiating into the buttock  down the lateral left thigh into the ankle.  Not going into the foot.  No real right-sided involvement.  Overall the back pain is worse 70%, versus 30% left leg.  He has done physical therapy this was about a year ago, but he did not get any long-term relief.  Dr. Dodson has given him some home exercises to do but they are not helping.  When I internally and externally rotate his left leg and hip I can reproduce nearly 100% of his hip and back pain.  He has no significant tenderness over his left greater trochanteric bursa.  No history of injections, no history of back surgery.    We ordered and reviewed tests today, I reviewed Dr. Dodson note from February 14, 2024 if this does discuss his back pain and radiating left leg pain.  This is a worsening chronic problem that has the potential to pose significant risk to bodily function.    For complete plan and/or surgical details, please refer to Dr. Coates's portion of this split dictation.    -Darius Hernandez PA-C    In a face-to-face encounter, I performed a history and physical examination, discussed pertinent diagnostic studies if indicated, and discussed diagnosis and management strategies with both the patient and the midlevel provider.  I reviewed the midlevel's note and agree with the documented findings and plan of care.    Back pain and left leg radiculopathy.  Patient has severe degenerative changes on his x-rays.   Range of motion of his hip does not recreate any of his symptoms the best I can tell.  He just gets a little bit of pain with that but does not recreate his symptoms.  He did physical therapy a year ago and failed that.  His pain is 70% back and 30% leg.  We are going to get him an MRI of his lumbar spine as he has an exacerbation of this chronic problem and he has 2 chronic problems that are getting worse of back pain and left leg pain.  We have ordered and reviewed x-rays today.  Reviewed the notes from his primary care Dr. Dodson  saying that he has this left leg radiculopathy.    Roshan Coates MD  Orthopedic surgery

## 2024-04-29 ENCOUNTER — HOSPITAL ENCOUNTER (OUTPATIENT)
Dept: RADIOLOGY | Facility: HOSPITAL | Age: 74
Discharge: HOME | End: 2024-04-29
Payer: MEDICARE

## 2024-04-29 DIAGNOSIS — M54.16 LUMBAR RADICULITIS: ICD-10-CM

## 2024-04-29 DIAGNOSIS — M51.36 LUMBAR DEGENERATIVE DISC DISEASE: ICD-10-CM

## 2024-04-29 DIAGNOSIS — M54.50 LOW BACK PAIN, UNSPECIFIED BACK PAIN LATERALITY, UNSPECIFIED CHRONICITY, UNSPECIFIED WHETHER SCIATICA PRESENT: ICD-10-CM

## 2024-04-29 PROCEDURE — 72148 MRI LUMBAR SPINE W/O DYE: CPT

## 2024-04-29 PROCEDURE — 72148 MRI LUMBAR SPINE W/O DYE: CPT | Performed by: RADIOLOGY

## 2024-05-01 DIAGNOSIS — I10 PRIMARY HYPERTENSION: ICD-10-CM

## 2024-05-01 RX ORDER — POTASSIUM CHLORIDE 20 MEQ/1
20 TABLET, EXTENDED RELEASE ORAL DAILY
Qty: 90 TABLET | Refills: 0 | Status: SHIPPED | OUTPATIENT
Start: 2024-05-01

## 2024-05-09 ENCOUNTER — OFFICE VISIT (OUTPATIENT)
Dept: ORTHOPEDIC SURGERY | Facility: CLINIC | Age: 74
End: 2024-05-09
Payer: MEDICARE

## 2024-05-09 ENCOUNTER — LAB (OUTPATIENT)
Dept: LAB | Facility: LAB | Age: 74
End: 2024-05-09
Payer: MEDICARE

## 2024-05-09 DIAGNOSIS — M54.16 LUMBAR RADICULITIS: Primary | ICD-10-CM

## 2024-05-09 DIAGNOSIS — R82.71 BACTERIURIA: ICD-10-CM

## 2024-05-09 DIAGNOSIS — N40.1 BPH WITH OBSTRUCTION/LOWER URINARY TRACT SYMPTOMS: ICD-10-CM

## 2024-05-09 DIAGNOSIS — R35.1 NOCTURIA: ICD-10-CM

## 2024-05-09 DIAGNOSIS — E06.3 HYPOTHYROIDISM DUE TO HASHIMOTO'S THYROIDITIS: ICD-10-CM

## 2024-05-09 DIAGNOSIS — E78.2 MIXED HYPERLIPIDEMIA: ICD-10-CM

## 2024-05-09 DIAGNOSIS — I10 PRIMARY HYPERTENSION: ICD-10-CM

## 2024-05-09 DIAGNOSIS — N13.8 BPH WITH OBSTRUCTION/LOWER URINARY TRACT SYMPTOMS: ICD-10-CM

## 2024-05-09 DIAGNOSIS — E03.8 HYPOTHYROIDISM DUE TO HASHIMOTO'S THYROIDITIS: ICD-10-CM

## 2024-05-09 DIAGNOSIS — R31.29 MICROSCOPIC HEMATURIA: ICD-10-CM

## 2024-05-09 DIAGNOSIS — Z12.5 PROSTATE CANCER SCREENING: ICD-10-CM

## 2024-05-09 DIAGNOSIS — E55.9 VITAMIN D DEFICIENCY: ICD-10-CM

## 2024-05-09 LAB
25(OH)D3 SERPL-MCNC: 51 NG/ML (ref 30–100)
ALBUMIN SERPL BCP-MCNC: 4.2 G/DL (ref 3.4–5)
ALP SERPL-CCNC: 81 U/L (ref 33–136)
ALT SERPL W P-5'-P-CCNC: 6 U/L (ref 10–52)
ANION GAP SERPL CALC-SCNC: 10 MMOL/L (ref 10–20)
APPEARANCE UR: CLEAR
AST SERPL W P-5'-P-CCNC: 18 U/L (ref 9–39)
BACTERIA #/AREA URNS AUTO: ABNORMAL /HPF
BASOPHILS # BLD AUTO: 0.02 X10*3/UL (ref 0–0.1)
BASOPHILS NFR BLD AUTO: 0.3 %
BILIRUB SERPL-MCNC: 1.5 MG/DL (ref 0–1.2)
BILIRUB UR STRIP.AUTO-MCNC: NEGATIVE MG/DL
BUN SERPL-MCNC: 14 MG/DL (ref 6–23)
CALCIUM SERPL-MCNC: 9.3 MG/DL (ref 8.6–10.3)
CHLORIDE SERPL-SCNC: 103 MMOL/L (ref 98–107)
CHOLEST SERPL-MCNC: 133 MG/DL (ref 0–199)
CHOLESTEROL/HDL RATIO: 3.2
CO2 SERPL-SCNC: 31 MMOL/L (ref 21–32)
COLOR UR: YELLOW
CREAT SERPL-MCNC: 0.84 MG/DL (ref 0.5–1.3)
EGFRCR SERPLBLD CKD-EPI 2021: >90 ML/MIN/1.73M*2
EOSINOPHIL # BLD AUTO: 0.02 X10*3/UL (ref 0–0.4)
EOSINOPHIL NFR BLD AUTO: 0.3 %
ERYTHROCYTE [DISTWIDTH] IN BLOOD BY AUTOMATED COUNT: 13.9 % (ref 11.5–14.5)
GLUCOSE SERPL-MCNC: 87 MG/DL (ref 74–99)
GLUCOSE UR STRIP.AUTO-MCNC: NEGATIVE MG/DL
HCT VFR BLD AUTO: 44.7 % (ref 41–52)
HDLC SERPL-MCNC: 41.4 MG/DL
HGB BLD-MCNC: 14.5 G/DL (ref 13.5–17.5)
IMM GRANULOCYTES # BLD AUTO: 0.02 X10*3/UL (ref 0–0.5)
IMM GRANULOCYTES NFR BLD AUTO: 0.3 % (ref 0–0.9)
KETONES UR STRIP.AUTO-MCNC: NEGATIVE MG/DL
LDLC SERPL CALC-MCNC: 61 MG/DL
LEUKOCYTE ESTERASE UR QL STRIP.AUTO: ABNORMAL
LYMPHOCYTES # BLD AUTO: 1.74 X10*3/UL (ref 0.8–3)
LYMPHOCYTES NFR BLD AUTO: 26.7 %
MCH RBC QN AUTO: 30 PG (ref 26–34)
MCHC RBC AUTO-ENTMCNC: 32.4 G/DL (ref 32–36)
MCV RBC AUTO: 93 FL (ref 80–100)
MONOCYTES # BLD AUTO: 0.5 X10*3/UL (ref 0.05–0.8)
MONOCYTES NFR BLD AUTO: 7.7 %
MUCOUS THREADS #/AREA URNS AUTO: ABNORMAL /LPF
NEUTROPHILS # BLD AUTO: 4.22 X10*3/UL (ref 1.6–5.5)
NEUTROPHILS NFR BLD AUTO: 64.7 %
NITRITE UR QL STRIP.AUTO: NEGATIVE
NON HDL CHOLESTEROL: 92 MG/DL (ref 0–149)
NRBC BLD-RTO: 0 /100 WBCS (ref 0–0)
PH UR STRIP.AUTO: 6 [PH]
PLATELET # BLD AUTO: 184 X10*3/UL (ref 150–450)
POTASSIUM SERPL-SCNC: 4 MMOL/L (ref 3.5–5.3)
PROT SERPL-MCNC: 6.8 G/DL (ref 6.4–8.2)
PROT UR STRIP.AUTO-MCNC: NEGATIVE MG/DL
PSA SERPL-MCNC: 3.28 NG/ML
RBC # BLD AUTO: 4.83 X10*6/UL (ref 4.5–5.9)
RBC # UR STRIP.AUTO: NEGATIVE /UL
RBC #/AREA URNS AUTO: ABNORMAL /HPF
SODIUM SERPL-SCNC: 140 MMOL/L (ref 136–145)
SP GR UR STRIP.AUTO: 1.01
T4 FREE SERPL-MCNC: 0.9 NG/DL (ref 0.61–1.12)
TRIGL SERPL-MCNC: 155 MG/DL (ref 0–149)
TSH SERPL-ACNC: 4.28 MIU/L (ref 0.44–3.98)
UROBILINOGEN UR STRIP.AUTO-MCNC: <2 MG/DL
VLDL: 31 MG/DL (ref 0–40)
WBC # BLD AUTO: 6.5 X10*3/UL (ref 4.4–11.3)
WBC #/AREA URNS AUTO: ABNORMAL /HPF

## 2024-05-09 PROCEDURE — 84439 ASSAY OF FREE THYROXINE: CPT

## 2024-05-09 PROCEDURE — 87086 URINE CULTURE/COLONY COUNT: CPT

## 2024-05-09 PROCEDURE — 1160F RVW MEDS BY RX/DR IN RCRD: CPT | Performed by: ORTHOPAEDIC SURGERY

## 2024-05-09 PROCEDURE — 99215 OFFICE O/P EST HI 40 MIN: CPT | Performed by: ORTHOPAEDIC SURGERY

## 2024-05-09 PROCEDURE — 84443 ASSAY THYROID STIM HORMONE: CPT

## 2024-05-09 PROCEDURE — 4010F ACE/ARB THERAPY RXD/TAKEN: CPT | Performed by: ORTHOPAEDIC SURGERY

## 2024-05-09 PROCEDURE — 80061 LIPID PANEL: CPT

## 2024-05-09 PROCEDURE — 36415 COLL VENOUS BLD VENIPUNCTURE: CPT

## 2024-05-09 PROCEDURE — 80053 COMPREHEN METABOLIC PANEL: CPT

## 2024-05-09 PROCEDURE — G0103 PSA SCREENING: HCPCS

## 2024-05-09 PROCEDURE — 1159F MED LIST DOCD IN RCRD: CPT | Performed by: ORTHOPAEDIC SURGERY

## 2024-05-09 PROCEDURE — 82306 VITAMIN D 25 HYDROXY: CPT

## 2024-05-09 PROCEDURE — 85025 COMPLETE CBC W/AUTO DIFF WBC: CPT

## 2024-05-09 PROCEDURE — 81001 URINALYSIS AUTO W/SCOPE: CPT

## 2024-05-09 NOTE — PROGRESS NOTES
Patricio Mae is a 73 y.o. male who presents for Follow-up of the Lower Back (MRI done at ).    HPI:  73-year-old gentleman here for follow-up of low back pain and left leg pain.  He is here for MRI review.  He denies any fever chills nausea vomiting night sweats.  He has no bowel or bladder complaints.    Physical exam:  Well-nourished, well kept.No lymphangitis or lymphadenopathy in the examined extremities.  Gait normal.  Can stand on heels and toes with some difficulty.  He has a brace on his right foot for ruptured Achilles tendon.   Examination of the back shows tenderness in the paraspinous musculature in the left lumbosacral area.  There is decreased range of motion in all directions due to guarding/muscle spasms and pain at extremes.  There is good strength and no instability.  Examination of the lower extremities reveals no point tenderness, swelling, or deformity.  Range of motion of the hips, knees, and ankles are full without crepitance, instability, or exacerbation of pain.  Strength is 5/5 throughout except knee flexion extension on the left is just a little weaker than the right this may be a pain guarding response.  No redness, abrasions, or lesions on extremities  Gross sensation intact in the extremities.   Affect normal.  Alert and oriented ×3.  Coordination normal.    Imaging studies:  An MRI from April 29, 2024 was reviewed today.    Assessment:  73-year-old gentleman with a history of low back pain 70% versus left leg pain 30% here for MRI review.  He has not had any recent physical therapy, the last PT he did was about a year ago but it did not help.  The pain in the legs starts in the left lumbosacral area goes from the buttock and hip down the lateral thigh and stops at the knee.  He has a moderate to severely degenerative spine on plain films throughout the lumbar spine.  His MRI does show multiple levels of central and foraminal stenosis.    For complete plan and/or surgical details,  please refer to Dr. Coates's portion of this split dictation.    -Darius Hernandez PA-C    In a face-to-face encounter, I performed a history and physical examination, discussed pertinent diagnostic studies if indicated, and discussed diagnosis and management strategies with both the patient and the midlevel provider.  I reviewed the midlevel's note and agree with the documented findings and plan of care.    Back pain left leg radiculopathy here for an MRI follow-up.  He is in nothing for this except physical therapy a year ago.  The back is worse than the left leg and is the main issue.  He is severely inhibited with bodily function as it is affecting his ADLs.  He feels like it may be getting worse.  His MRI shows stenosis in the foramen at L5-S1 on the left and also some moderate central stenosis L2-3 L3-4.  I had a long discussion with the patient and explained to them that their options are 1) live with the symptoms and see how they evolve, 2) physical therapy, 3) pain management or 4) surgery.  At this point the patient is going to try some physical therapy and pain management.  Will give us a call if he does not do well with that.  An operation on him would probably be an L2-S1 midline laminectomy and decompressing the foramen on the left at L5-S1.  However, he understands that we will likely only affect his left leg pain and may not give him back pain relief.  The back is the predominant problem now.    Roshna Coates MD  Orthopedic surgery

## 2024-05-10 ENCOUNTER — TELEPHONE (OUTPATIENT)
Dept: PRIMARY CARE | Facility: CLINIC | Age: 74
End: 2024-05-10
Payer: MEDICARE

## 2024-05-10 DIAGNOSIS — N13.8 BPH WITH OBSTRUCTION/LOWER URINARY TRACT SYMPTOMS: Primary | ICD-10-CM

## 2024-05-10 DIAGNOSIS — N40.1 BPH WITH OBSTRUCTION/LOWER URINARY TRACT SYMPTOMS: Primary | ICD-10-CM

## 2024-05-10 DIAGNOSIS — R82.71 BACTERIURIA: ICD-10-CM

## 2024-05-10 NOTE — RESULT ENCOUNTER NOTE
Labs showed thyroid slightly underactive. Urine had some small amount of bacteria which is likely contamination of the specimen. a urine culture was sent for further evaluation. The rest of the labs were either normal or stable.

## 2024-05-10 NOTE — TELEPHONE ENCOUNTER
----- Message from Meir Dodson DO sent at 5/10/2024  8:14 AM EDT -----  Labs showed thyroid slightly underactive. Urine had some small amount of bacteria which is likely contamination of the specimen. a urine culture was sent for further evaluation. The rest of the labs were either normal or stable.

## 2024-05-11 LAB — BACTERIA UR CULT: NO GROWTH

## 2024-05-13 ENCOUNTER — OFFICE VISIT (OUTPATIENT)
Dept: UROLOGY | Facility: CLINIC | Age: 74
End: 2024-05-13
Payer: MEDICARE

## 2024-05-13 VITALS
WEIGHT: 224 LBS | HEART RATE: 62 BPM | TEMPERATURE: 96.2 F | SYSTOLIC BLOOD PRESSURE: 114 MMHG | BODY MASS INDEX: 35.08 KG/M2 | DIASTOLIC BLOOD PRESSURE: 68 MMHG

## 2024-05-13 DIAGNOSIS — N13.8 BPH WITH OBSTRUCTION/LOWER URINARY TRACT SYMPTOMS: ICD-10-CM

## 2024-05-13 DIAGNOSIS — R35.1 NOCTURIA: ICD-10-CM

## 2024-05-13 DIAGNOSIS — N40.1 BPH WITH OBSTRUCTION/LOWER URINARY TRACT SYMPTOMS: ICD-10-CM

## 2024-05-13 PROBLEM — H02.059 TRICHIASIS: Status: ACTIVE | Noted: 2024-02-14

## 2024-05-13 PROBLEM — Z77.29 EXPOSURE TO POTENTIALLY HAZARDOUS SUBSTANCE: Status: ACTIVE | Noted: 2024-05-13

## 2024-05-13 PROBLEM — R06.09 CHRONIC DYSPNEA: Status: ACTIVE | Noted: 2024-05-13

## 2024-05-13 PROBLEM — R25.1 TREMOR: Status: ACTIVE | Noted: 2024-05-13

## 2024-05-13 PROBLEM — E11.9 TYPE 2 DIABETES MELLITUS (MULTI): Status: ACTIVE | Noted: 2024-02-14

## 2024-05-13 PROBLEM — M65.90 SYNOVITIS AND TENOSYNOVITIS: Status: ACTIVE | Noted: 2024-02-14

## 2024-05-13 PROBLEM — R10.9 FLANK PAIN: Status: ACTIVE | Noted: 2024-05-13

## 2024-05-13 PROBLEM — G25.0 ESSENTIAL TREMOR: Status: ACTIVE | Noted: 2024-05-13

## 2024-05-13 PROBLEM — H04.123 DRY EYE SYNDROME OF BILATERAL LACRIMAL GLANDS: Status: ACTIVE | Noted: 2024-05-13

## 2024-05-13 PROBLEM — M54.30 SCIATICA: Status: ACTIVE | Noted: 2023-04-26

## 2024-05-13 PROBLEM — M25.559 ARTHRALGIA OF HIP: Status: ACTIVE | Noted: 2024-05-13

## 2024-05-13 PROBLEM — M65.9 SYNOVITIS AND TENOSYNOVITIS: Status: ACTIVE | Noted: 2024-02-14

## 2024-05-13 PROBLEM — M19.011 PRIMARY OSTEOARTHRITIS, RIGHT SHOULDER: Status: ACTIVE | Noted: 2024-05-13

## 2024-05-13 PROBLEM — M51.36 DEGENERATION OF INTERVERTEBRAL DISC OF LUMBAR REGION: Status: ACTIVE | Noted: 2024-05-13

## 2024-05-13 PROBLEM — M51.369 DEGENERATION OF INTERVERTEBRAL DISC OF LUMBAR REGION: Status: ACTIVE | Noted: 2024-05-13

## 2024-05-13 PROBLEM — D69.6 LOW PLATELET COUNT (CMS-HCC): Status: ACTIVE | Noted: 2019-01-14

## 2024-05-13 PROBLEM — I95.1 ORTHOSTATIC HYPOTENSION: Status: ACTIVE | Noted: 2024-05-13

## 2024-05-13 PROBLEM — R04.0 EPISTAXIS: Status: ACTIVE | Noted: 2024-05-13

## 2024-05-13 PROBLEM — I51.9 HEART DISEASE: Status: ACTIVE | Noted: 2024-05-13

## 2024-05-13 PROCEDURE — 1160F RVW MEDS BY RX/DR IN RCRD: CPT | Performed by: UROLOGY

## 2024-05-13 PROCEDURE — 3078F DIAST BP <80 MM HG: CPT | Performed by: UROLOGY

## 2024-05-13 PROCEDURE — 1159F MED LIST DOCD IN RCRD: CPT | Performed by: UROLOGY

## 2024-05-13 PROCEDURE — 3048F LDL-C <100 MG/DL: CPT | Performed by: UROLOGY

## 2024-05-13 PROCEDURE — 1036F TOBACCO NON-USER: CPT | Performed by: UROLOGY

## 2024-05-13 PROCEDURE — G2211 COMPLEX E/M VISIT ADD ON: HCPCS | Performed by: UROLOGY

## 2024-05-13 PROCEDURE — 4010F ACE/ARB THERAPY RXD/TAKEN: CPT | Performed by: UROLOGY

## 2024-05-13 PROCEDURE — 99204 OFFICE O/P NEW MOD 45 MIN: CPT | Performed by: UROLOGY

## 2024-05-13 PROCEDURE — 3074F SYST BP LT 130 MM HG: CPT | Performed by: UROLOGY

## 2024-05-13 RX ORDER — TAMSULOSIN HYDROCHLORIDE 0.4 MG/1
0.4 CAPSULE ORAL 2 TIMES DAILY
Qty: 180 CAPSULE | Refills: 3 | Status: SHIPPED | OUTPATIENT
Start: 2024-05-13

## 2024-05-13 RX ORDER — FINASTERIDE 5 MG/1
5 TABLET, FILM COATED ORAL DAILY
Qty: 90 TABLET | Refills: 3 | Status: SHIPPED | OUTPATIENT
Start: 2024-05-13 | End: 2025-05-13

## 2024-05-13 NOTE — PROGRESS NOTES
History Of Present Illness  73-year-old male seeing me regarding BPH and lower urinary tract symptoms  PMH: Hypertension, hyperlipidemia, claudication, thrombocytopenia, BMI 35, type 2 diabetes, depression, Parkinson's, sleep apnea    Last seen by Luz Marina Hsieh CNP in 2020.  Patient had UroLift in 2019    Pt reports incomplete emptying and frequency.   Endorses: occasional hesitancy, weak stream, intermittency, straining, frequency, urgency, dribbling w/ urgency.   Denies: Hematuria  NTF: 4x  On tamsulosin 0.4mg at bedtime since 2022  UA 05/2024 no RBCs, culture negative  05/2024 - 3.28    Past Medical History  He has a past medical history of Asymptomatic varicose veins of unspecified lower extremity (12/03/2019), Body mass index (BMI) 36.0-36.9, adult, Chronic rhinitis (01/18/2018), Cyanosis (11/10/2021), Dizziness and giddiness (11/16/2021), Dysphonia (05/19/2022), Encounter for general adult medical examination without abnormal findings (11/10/2021), Encounter for screening for malignant neoplasm of prostate (11/10/2021), Essential (primary) hypertension (02/07/2022), Localized swelling, mass and lump, neck (11/10/2021), Morbid (severe) obesity due to excess calories (Multi) (05/19/2022), Obesity, unspecified, Obesity, unspecified (08/15/2022), Other specified abnormal findings of blood chemistry (11/10/2021), Other specified postprocedural states (11/10/2021), Other specified soft tissue disorders (12/03/2019), Pain in right shoulder (05/19/2022), Pain in unspecified shoulder, Personal history of other diseases of the circulatory system (05/09/2022), Personal history of other diseases of the circulatory system (04/29/2016), Personal history of other diseases of the nervous system and sense organs (05/14/2021), Personal history of other diseases of the nervous system and sense organs (11/10/2021), Personal history of other diseases of the nervous system and sense organs (11/10/2021), Personal history of  other diseases of urinary system (11/10/2021), Personal history of other endocrine, nutritional and metabolic disease (04/29/2016), Personal history of other specified conditions, Personal history of other specified conditions (01/18/2018), Personal history of other specified conditions (05/14/2021), Shortness of breath (11/16/2021), Syncope and collapse (05/10/2022), Tremor, unspecified (06/22/2021), Unspecified abnormal findings in urine (11/10/2021), Unspecified disturbances of skin sensation (05/13/2021), and Varicose veins of right lower extremity with inflammation (12/03/2019).    Surgical History  He has a past surgical history that includes Cataract extraction (04/29/2016); Gallbladder surgery (04/29/2016); Other surgical history (11/10/2021); Other surgical history (11/10/2021); and Other surgical history (11/10/2021).     Social History  He reports that he has never smoked. He has been exposed to tobacco smoke. He has never used smokeless tobacco. He reports that he does not currently use alcohol. He reports that he does not use drugs.    Family History  Family History   Problem Relation Name Age of Onset    Hypertension Mother      Diabetes Father      Other (cardiac disorder) Father      Other (arteriosclerotic cardiovascular disease) Father      Coronary artery disease Father      Coronary artery disease Sister      Other (malignant neoplasm of uterus) Sister      Heart failure Brother          Allergies  Cefuroxime axetil, Hydrocortisone-iodoquinol-aloe, Metoprolol, Penicillins, and Simvastatin    ROS: 12 system review was completed and is negative with the exception of those signs and symptoms noted in the history of present illness: A 12 system review was completed and is negative with the exception of those signs and symptoms noted in the history of present illness.     Exam:  General: in NAD, appears stated age  Head: normocephalic, atraumatic  Respiratory: normal effort, no use of accessory  muscles  Cardiovascular: no edema noted  Skin: normal turgor, no rashes  Neurologic: grossly intact, oriented to person/place/time  Psychiatric: mode and affect appropriate     Last Recorded Vitals  Blood pressure 114/68, pulse 62, temperature 35.7 °C (96.2 °F), weight 102 kg (224 lb).    Lab Results   Component Value Date    PSASCREEN 3.28 05/09/2024    CREATININE 0.84 05/09/2024    HGB 14.5 05/09/2024         ASSESSMENT/PLAN:  73-year-old male with BPH and lower urinary tract symptoms  -We discussed managing with medications versus surgical intervention  -Discussed tamsulosin 0.4 mg twice daily, adding finasteride 5 mg daily.  We discussed the pros and cons of this approach.  Discussed side effects of finasteride  -Patient opted to proceed this route  -I did mention doing the possibility of surgical intervention, including transurethral resection versus holmium enucleation versus robotic simple prostatectomy  -If he does not have an adequate response to treatment with medications, plan for cystoscopy and transrectal ultrasound    Jarrett Tarango MD

## 2024-05-14 ENCOUNTER — OFFICE VISIT (OUTPATIENT)
Dept: PRIMARY CARE | Facility: CLINIC | Age: 74
End: 2024-05-14
Payer: MEDICARE

## 2024-05-14 VITALS
HEIGHT: 67 IN | WEIGHT: 226 LBS | DIASTOLIC BLOOD PRESSURE: 73 MMHG | HEART RATE: 55 BPM | TEMPERATURE: 98 F | BODY MASS INDEX: 35.47 KG/M2 | SYSTOLIC BLOOD PRESSURE: 112 MMHG

## 2024-05-14 DIAGNOSIS — G89.29 CHRONIC LEFT-SIDED LOW BACK PAIN WITH LEFT-SIDED SCIATICA: ICD-10-CM

## 2024-05-14 DIAGNOSIS — Z00.00 HEALTH MAINTENANCE EXAMINATION: Primary | ICD-10-CM

## 2024-05-14 DIAGNOSIS — N13.8 BENIGN PROSTATIC HYPERPLASIA WITH URINARY OBSTRUCTION: ICD-10-CM

## 2024-05-14 DIAGNOSIS — F32.5 MAJOR DEPRESSION IN REMISSION (CMS-HCC): ICD-10-CM

## 2024-05-14 DIAGNOSIS — F41.1 GENERALIZED ANXIETY DISORDER: ICD-10-CM

## 2024-05-14 DIAGNOSIS — G20.A1 PARKINSON'S DISEASE, UNSPECIFIED WHETHER DYSKINESIA PRESENT, UNSPECIFIED WHETHER MANIFESTATIONS FLUCTUATE (MULTI): ICD-10-CM

## 2024-05-14 DIAGNOSIS — E03.8 HYPOTHYROIDISM DUE TO HASHIMOTO'S THYROIDITIS: ICD-10-CM

## 2024-05-14 DIAGNOSIS — E06.3 HYPOTHYROIDISM DUE TO HASHIMOTO'S THYROIDITIS: ICD-10-CM

## 2024-05-14 DIAGNOSIS — M54.42 CHRONIC LEFT-SIDED LOW BACK PAIN WITH LEFT-SIDED SCIATICA: ICD-10-CM

## 2024-05-14 DIAGNOSIS — Z78.9 NEVER SMOKED ANY SUBSTANCE: ICD-10-CM

## 2024-05-14 DIAGNOSIS — I10 PRIMARY HYPERTENSION: ICD-10-CM

## 2024-05-14 DIAGNOSIS — E78.2 MIXED HYPERLIPIDEMIA: ICD-10-CM

## 2024-05-14 DIAGNOSIS — R13.10 DYSPHAGIA, UNSPECIFIED TYPE: ICD-10-CM

## 2024-05-14 DIAGNOSIS — R73.03 PREDIABETES: ICD-10-CM

## 2024-05-14 DIAGNOSIS — N40.1 BENIGN PROSTATIC HYPERPLASIA WITH URINARY OBSTRUCTION: ICD-10-CM

## 2024-05-14 DIAGNOSIS — K21.9 GASTROESOPHAGEAL REFLUX DISEASE WITHOUT ESOPHAGITIS: ICD-10-CM

## 2024-05-14 DIAGNOSIS — E55.9 VITAMIN D DEFICIENCY: ICD-10-CM

## 2024-05-14 PROBLEM — R04.0 EPISTAXIS: Status: RESOLVED | Noted: 2024-05-13 | Resolved: 2024-05-14

## 2024-05-14 PROBLEM — E66.2 CLASS 2 OBESITY WITH ALVEOLAR HYPOVENTILATION AND BODY MASS INDEX (BMI) OF 36.0 TO 36.9 IN ADULT (MULTI): Status: RESOLVED | Noted: 2023-10-02 | Resolved: 2024-05-14

## 2024-05-14 PROBLEM — E66.812 CLASS 2 OBESITY WITH ALVEOLAR HYPOVENTILATION AND BODY MASS INDEX (BMI) OF 36.0 TO 36.9 IN ADULT: Status: RESOLVED | Noted: 2023-10-02 | Resolved: 2024-05-14

## 2024-05-14 PROBLEM — E66.812 CLASS 2 OBESITY WITH ALVEOLAR HYPOVENTILATION AND BODY MASS INDEX (BMI) OF 37.0 TO 37.9 IN ADULT: Status: RESOLVED | Noted: 2023-10-02 | Resolved: 2024-05-14

## 2024-05-14 PROBLEM — H11.823 CONJUNCTIVOCHALASIS OF BOTH EYES: Status: RESOLVED | Noted: 2024-02-14 | Resolved: 2024-05-14

## 2024-05-14 PROBLEM — F33.2 MAJOR DEPRESSIVE DISORDER, RECURRENT SEVERE WITHOUT PSYCHOTIC FEATURES (MULTI): Status: RESOLVED | Noted: 2024-02-14 | Resolved: 2024-05-14

## 2024-05-14 PROBLEM — E11.9 TYPE 2 DIABETES MELLITUS (MULTI): Status: RESOLVED | Noted: 2024-02-14 | Resolved: 2024-05-14

## 2024-05-14 PROBLEM — E66.2 CLASS 2 OBESITY WITH ALVEOLAR HYPOVENTILATION AND BODY MASS INDEX (BMI) OF 37.0 TO 37.9 IN ADULT (MULTI): Status: RESOLVED | Noted: 2023-10-02 | Resolved: 2024-05-14

## 2024-05-14 PROCEDURE — G0444 DEPRESSION SCREEN ANNUAL: HCPCS | Performed by: INTERNAL MEDICINE

## 2024-05-14 PROCEDURE — 3074F SYST BP LT 130 MM HG: CPT | Performed by: INTERNAL MEDICINE

## 2024-05-14 PROCEDURE — 3078F DIAST BP <80 MM HG: CPT | Performed by: INTERNAL MEDICINE

## 2024-05-14 PROCEDURE — 99213 OFFICE O/P EST LOW 20 MIN: CPT | Performed by: INTERNAL MEDICINE

## 2024-05-14 PROCEDURE — 1160F RVW MEDS BY RX/DR IN RCRD: CPT | Performed by: INTERNAL MEDICINE

## 2024-05-14 PROCEDURE — 1036F TOBACCO NON-USER: CPT | Performed by: INTERNAL MEDICINE

## 2024-05-14 PROCEDURE — G0439 PPPS, SUBSEQ VISIT: HCPCS | Performed by: INTERNAL MEDICINE

## 2024-05-14 PROCEDURE — 1170F FXNL STATUS ASSESSED: CPT | Performed by: INTERNAL MEDICINE

## 2024-05-14 PROCEDURE — 1159F MED LIST DOCD IN RCRD: CPT | Performed by: INTERNAL MEDICINE

## 2024-05-14 PROCEDURE — 99397 PER PM REEVAL EST PAT 65+ YR: CPT | Performed by: INTERNAL MEDICINE

## 2024-05-14 RX ORDER — OMEPRAZOLE 40 MG/1
40 CAPSULE, DELAYED RELEASE ORAL
Qty: 90 CAPSULE | Refills: 0 | Status: SHIPPED | OUTPATIENT
Start: 2024-05-14 | End: 2024-08-12

## 2024-05-14 RX ORDER — LIDOCAINE 50 MG/G
1 PATCH TOPICAL DAILY
Qty: 30 PATCH | Refills: 11 | Status: SHIPPED | OUTPATIENT
Start: 2024-05-14 | End: 2025-05-14

## 2024-05-14 ASSESSMENT — ENCOUNTER SYMPTOMS
VOMITING: 0
AGITATION: 0
DIZZINESS: 0
BACK PAIN: 1
TROUBLE SWALLOWING: 1
COUGH: 0
CHILLS: 0
LIGHT-HEADEDNESS: 0
SHORTNESS OF BREATH: 0
PALPITATIONS: 0
DYSURIA: 0
DEPRESSION: 0
OCCASIONAL FEELINGS OF UNSTEADINESS: 0
NAUSEA: 0
SORE THROAT: 1
ABDOMINAL PAIN: 0
FEVER: 0
LOSS OF SENSATION IN FEET: 0
DIARRHEA: 0
HEMATURIA: 0

## 2024-05-14 ASSESSMENT — ACTIVITIES OF DAILY LIVING (ADL)
PATIENT'S MEMORY ADEQUATE TO SAFELY COMPLETE DAILY ACTIVITIES?: YES
DRESSING YOURSELF: INDEPENDENT
HEARING - LEFT EAR: HEARING AID
TOILETING: INDEPENDENT
HEARING - RIGHT EAR: HEARING AID
JUDGMENT_ADEQUATE_SAFELY_COMPLETE_DAILY_ACTIVITIES: YES
FEEDING YOURSELF: INDEPENDENT
WALKS IN HOME: INDEPENDENT
DOING_HOUSEWORK: INDEPENDENT
TAKING_MEDICATION: INDEPENDENT
ADEQUATE_TO_COMPLETE_ADL: YES
GROOMING: INDEPENDENT
MANAGING_FINANCES: INDEPENDENT
GROCERY_SHOPPING: INDEPENDENT
BATHING: INDEPENDENT

## 2024-05-14 ASSESSMENT — COLUMBIA-SUICIDE SEVERITY RATING SCALE - C-SSRS
1. IN THE PAST MONTH, HAVE YOU WISHED YOU WERE DEAD OR WISHED YOU COULD GO TO SLEEP AND NOT WAKE UP?: NO
2. HAVE YOU ACTUALLY HAD ANY THOUGHTS OF KILLING YOURSELF?: NO
6. HAVE YOU EVER DONE ANYTHING, STARTED TO DO ANYTHING, OR PREPARED TO DO ANYTHING TO END YOUR LIFE?: NO

## 2024-05-14 NOTE — PROGRESS NOTES
Subjective   Reason for Visit: Patricio Mae is an 73 y.o. male here for a Medicare Wellness visit.     Past Medical, Surgical, and Family History reviewed and updated in chart.    Reviewed all medications by prescribing practitioner or clinical pharmacist (such as prescriptions, OTCs, herbal therapies and supplements) and documented in the medical record.    HPI patient presents to the office for annual wellness visit.  Patient is up-to-date on age and gender recommended screening and immunizations.  Patient recently followed up with Urology yesterday for BPH with lower urinary tract symptoms.  States he was started on finasteride.  [facility] urology was aware of his increase in PSA.  PSA still within normal limits.  Patient has not started the medication yet.  He has noticed improvement and orthostatic dizziness with taking Flomax once daily.  He does complain of acid reflux states he is eating have an episode of vomiting with the reflux he was not sure if some of this may be related to Parkinson's disease.  Patient also noted some difficulty swallowing associated with the symptoms.  He is following with Dr. Coates for back pain.  He would like medication such as lidocaine patch for his back pain we also discussed his most recent blood work in detail he had slightly elevated TSH level.  He is taking his thyroid medication as directed.  Vitamin D level was normal cholesterol well-controlled with some mild elevation of triglycerides.    Patient Care Team:  Meir Dodson DO as PCP - General (Internal Medicine)  Meir Dodson DO as PCP - [employer] PCP     Review of Systems   Constitutional:  Negative for chills and fever.   HENT:  Positive for sore throat and trouble swallowing.    Eyes:  Negative for visual disturbance.   Respiratory:  Negative for cough and shortness of breath.    Cardiovascular:  Negative for chest pain, palpitations and leg swelling.   Gastrointestinal:  Negative for  "abdominal pain, diarrhea, nausea and vomiting.   Genitourinary:  Negative for dysuria and hematuria.   Musculoskeletal:  Positive for back pain.   Skin:  Negative for rash.   Neurological:  Negative for dizziness, syncope and light-headedness.   Psychiatric/Behavioral:  Negative for agitation.        Objective   Vitals:  /73   Pulse 55   Temp 36.7 °C (98 °F) (Temporal)   Ht 1.702 m (5' 7\")   Wt 103 kg (226 lb)   BMI 35.40 kg/m²       Physical Exam  Vitals and nursing note reviewed.   Constitutional:       General: He is not in acute distress.     Appearance: Normal appearance. He is obese. He is not ill-appearing, toxic-appearing or diaphoretic.   HENT:      Head: Normocephalic and atraumatic.      Mouth/Throat:      Mouth: Mucous membranes are moist.      Pharynx: Oropharynx is clear. No oropharyngeal exudate.   Eyes:      Pupils: Pupils are equal, round, and reactive to light.   Cardiovascular:      Rate and Rhythm: Normal rate and regular rhythm.      Heart sounds: Normal heart sounds.   Pulmonary:      Effort: Pulmonary effort is normal. No respiratory distress.      Breath sounds: Normal breath sounds. No wheezing or rales.   Abdominal:      General: Bowel sounds are normal. There is no distension.      Palpations: Abdomen is soft. There is no mass.      Tenderness: There is no abdominal tenderness.   Musculoskeletal:      Cervical back: Neck supple. No tenderness.      Right lower leg: No edema.      Left lower leg: No edema.   Lymphadenopathy:      Cervical: No cervical adenopathy.   Skin:     General: Skin is warm and dry.      Coloration: Skin is not jaundiced.      Findings: No bruising or rash.   Neurological:      General: No focal deficit present.      Mental Status: He is alert and oriented to person, place, and time.   Psychiatric:         Mood and Affect: Mood normal.         Behavior: Behavior normal.         Thought Content: Thought content normal.         Judgment: Judgment normal. "         Assessment/Plan   Problem List Items Addressed This Visit       Benign prostatic hyperplasia with urinary obstruction    HTN (hypertension)    Relevant Orders    Comprehensive metabolic panel    Hyperlipidemia    Relevant Orders    Comprehensive metabolic panel    Lipid panel    Hypothyroidism    Relevant Orders    TSH with reflex to Free T4 if abnormal    Parkinsons disease (Multi)    Vitamin D deficiency    Relevant Orders    Vitamin D 25-Hydroxy,Total (for eval of Vitamin D levels)    Major depression in remission (CMS-HCC)    Never smoked any substance    Anxiety disorder    Low back pain, unspecified    Relevant Medications    lidocaine (Lidoderm) 5 % patch    Prediabetes    Relevant Orders    Hemoglobin A1C    Gastroesophageal reflux disease without esophagitis    Relevant Medications    omeprazole (PriLOSEC) 40 mg DR capsule    Other Relevant Orders    Referral to Gastroenterology    Health maintenance examination - Primary    Dysphagia    Relevant Orders    Referral to Gastroenterology     Health maintenance examination: Patient up-to-date on age and gender recommended screening and immunizations hypertension: Chronic, stable continue current medication regimen    BPH with urinary obstruction: Patient following with urology started on finasteride and will maintain on tamsulosin    Hyperlipidemia: Chronic, stable continue current medication regimen of rosuvastatin    Hypothyroidism: Patient had mild elevation in TSH we will recheck levels in 6 months we will hold on increasing the medication dose at this time since his level is borderline.    Parkinson's disease: Patient will continue follow-up with neurology remains on carbidopa levodopa    Vitamin D deficiency: Chronic, stable continue vitamin D    Major depression/anxiety disorder: Chronic, stable continue sertraline    Low back pain: Will prescribe Lidoderm patch continue follow-up with orthopedics and physical therapy    Prediabetes: We will  check a hemoglobin A1c    Gastroesophageal reflux disease: Will start the patient on omeprazole we have referred him to gastroenterology for possible EGD given his symptoms of dysphagia.    Depression screening was completed with PHQ 2 9 we are still    Labs will be repeated in 6 months and follow-up at that time

## 2024-07-29 DIAGNOSIS — I10 PRIMARY HYPERTENSION: ICD-10-CM

## 2024-07-29 RX ORDER — POTASSIUM CHLORIDE 20 MEQ/1
20 TABLET, EXTENDED RELEASE ORAL DAILY
Qty: 90 TABLET | Refills: 0 | Status: SHIPPED | OUTPATIENT
Start: 2024-07-29

## 2024-09-17 ENCOUNTER — OFFICE VISIT (OUTPATIENT)
Dept: PRIMARY CARE | Facility: CLINIC | Age: 74
End: 2024-09-17
Payer: MEDICARE

## 2024-09-17 VITALS
SYSTOLIC BLOOD PRESSURE: 100 MMHG | WEIGHT: 237 LBS | HEIGHT: 67 IN | BODY MASS INDEX: 37.2 KG/M2 | DIASTOLIC BLOOD PRESSURE: 62 MMHG | HEART RATE: 56 BPM

## 2024-09-17 DIAGNOSIS — M54.10 RADICULOPATHY OF ARM: ICD-10-CM

## 2024-09-17 DIAGNOSIS — M54.2 CERVICAL SPINE PAIN: Primary | ICD-10-CM

## 2024-09-17 DIAGNOSIS — R50.9 FEVER, UNSPECIFIED FEVER CAUSE: ICD-10-CM

## 2024-09-17 LAB — POC SARS-COV-2 AG BINAX: NORMAL

## 2024-09-17 PROCEDURE — 1036F TOBACCO NON-USER: CPT | Performed by: FAMILY MEDICINE

## 2024-09-17 PROCEDURE — 1124F ACP DISCUSS-NO DSCNMKR DOCD: CPT | Performed by: FAMILY MEDICINE

## 2024-09-17 PROCEDURE — 3078F DIAST BP <80 MM HG: CPT | Performed by: FAMILY MEDICINE

## 2024-09-17 PROCEDURE — 87811 SARS-COV-2 COVID19 W/OPTIC: CPT | Performed by: FAMILY MEDICINE

## 2024-09-17 PROCEDURE — 3074F SYST BP LT 130 MM HG: CPT | Performed by: FAMILY MEDICINE

## 2024-09-17 PROCEDURE — 99214 OFFICE O/P EST MOD 30 MIN: CPT | Performed by: FAMILY MEDICINE

## 2024-09-17 PROCEDURE — 3008F BODY MASS INDEX DOCD: CPT | Performed by: FAMILY MEDICINE

## 2024-09-17 PROCEDURE — 1159F MED LIST DOCD IN RCRD: CPT | Performed by: FAMILY MEDICINE

## 2024-09-17 RX ORDER — KETOCONAZOLE 20 MG/G
CREAM TOPICAL
COMMUNITY
Start: 2024-06-17

## 2024-09-17 RX ORDER — PRAMOXINE HYDROCHLORIDE 10 MG/ML
LOTION TOPICAL
COMMUNITY
Start: 2024-06-17

## 2024-09-17 RX ORDER — FLUOROMETHOLONE 1 MG/ML
1 SUSPENSION/ DROPS OPHTHALMIC 2 TIMES DAILY
COMMUNITY
Start: 2024-09-11

## 2024-09-17 RX ORDER — HYDROCORTISONE 25 MG/G
CREAM TOPICAL
COMMUNITY
Start: 2024-06-17

## 2024-09-17 RX ORDER — NAPROXEN 500 MG/1
500 TABLET ORAL 2 TIMES DAILY PRN
Qty: 60 TABLET | Refills: 0 | Status: SHIPPED | OUTPATIENT
Start: 2024-09-17 | End: 2024-10-17

## 2024-09-17 ASSESSMENT — ENCOUNTER SYMPTOMS
SHORTNESS OF BREATH: 0
DIZZINESS: 0
CHILLS: 0
HEADACHES: 0
FATIGUE: 0
CHEST TIGHTNESS: 0

## 2024-09-17 ASSESSMENT — PATIENT HEALTH QUESTIONNAIRE - PHQ9
1. LITTLE INTEREST OR PLEASURE IN DOING THINGS: NOT AT ALL
2. FEELING DOWN, DEPRESSED OR HOPELESS: NOT AT ALL
SUM OF ALL RESPONSES TO PHQ9 QUESTIONS 1 AND 2: 0

## 2024-09-17 NOTE — PROGRESS NOTES
"Subjective   Patient ID: Patricio Mae is a 73 y.o. male who presents for Sick Visit (Vertigo stiff neck and headache since friday).    Neck pain/vertigo   - has been complaining for the last several months and is getting gradually worse   - neck hurts worse when he truns right or left and back and forth   - does endorse feeling off balance and feeling like his head is spinning   - has parkinsons so he is often off balance, but is now feeling dizzy and is getting worse   - denies any fullness, lumps or bumps in his throat   - has had pain in the back before, in the lower back   - does also endorse pain in the middle of the neck   - has some neuropathy at baseline but endorsing new weakness/numbness in his L arm   - has not been taking any medication to help him with pain   - symptoms started as headache on Friday/Saturday   - Had RAMON test done approximately 5 years ago     Fever/sore throat   - reports he had a fever and sore throat   - does endorse some sinus symptoms   -            Review of Systems   Constitutional:  Negative for chills and fatigue.   Respiratory:  Negative for chest tightness and shortness of breath.    Neurological:  Negative for dizziness and headaches.       Objective   /62   Pulse 56   Ht 1.702 m (5' 7\")   Wt 108 kg (237 lb)   BMI 37.12 kg/m²     Physical Exam  Constitutional:       General: He is not in acute distress.     Appearance: Normal appearance.   HENT:      Mouth/Throat:      Mouth: Mucous membranes are moist.      Pharynx: Oropharynx is clear.   Cardiovascular:      Rate and Rhythm: Normal rate and regular rhythm.   Pulmonary:      Effort: No respiratory distress.      Breath sounds: No stridor. No wheezing.   Neurological:      Mental Status: He is alert.   Psychiatric:         Mood and Affect: Mood normal.         Behavior: Behavior normal.         Assessment/Plan   Problem List Items Addressed This Visit    None  Visit Diagnoses         Codes    Cervical spine pain    - "  Primary M54.2    Relevant Medications    naproxen (Naprosyn) 500 mg tablet    Other Relevant Orders    MR cervical spine wo IV contrast    Radiculopathy of arm     M54.10    stable  - check MRI neck given radicular symptoms   - f/u after imaging   - recommend f/u with neurology     Relevant Medications    naproxen (Naprosyn) 500 mg tablet    Other Relevant Orders    MR cervical spine wo IV contrast    Fever, unspecified fever cause     R50.9    stable  - likely viral   - covid test negative   - f/u PRN     Relevant Orders    POCT BinaxNOW Covid-19 Ag Card manually resulted (Completed)

## 2024-10-04 ENCOUNTER — HOSPITAL ENCOUNTER (OUTPATIENT)
Dept: RADIOLOGY | Facility: HOSPITAL | Age: 74
Discharge: HOME | End: 2024-10-04
Payer: MEDICARE

## 2024-10-04 DIAGNOSIS — M54.10 RADICULOPATHY OF ARM: ICD-10-CM

## 2024-10-04 DIAGNOSIS — M54.2 CERVICAL SPINE PAIN: ICD-10-CM

## 2024-10-04 PROCEDURE — 72141 MRI NECK SPINE W/O DYE: CPT

## 2024-10-07 ENCOUNTER — TELEPHONE (OUTPATIENT)
Dept: PRIMARY CARE | Facility: CLINIC | Age: 74
End: 2024-10-07
Payer: MEDICARE

## 2024-10-07 NOTE — TELEPHONE ENCOUNTER
----- Message from Osito Rojas sent at 10/7/2024  9:21 AM EDT -----  Your imaging shows multiple sources of arthritis and some spinal canal narrowing.  I am recommending f/u with a neurosurgeon to discuss options.  I will place the referral.

## 2024-10-28 DIAGNOSIS — I10 PRIMARY HYPERTENSION: ICD-10-CM

## 2024-10-30 RX ORDER — POTASSIUM CHLORIDE 20 MEQ/1
20 TABLET, EXTENDED RELEASE ORAL DAILY
Qty: 90 TABLET | Refills: 1 | Status: SHIPPED | OUTPATIENT
Start: 2024-10-30

## 2024-11-11 ENCOUNTER — APPOINTMENT (OUTPATIENT)
Dept: UROLOGY | Facility: CLINIC | Age: 74
End: 2024-11-11
Payer: MEDICARE

## 2024-11-11 VITALS
SYSTOLIC BLOOD PRESSURE: 102 MMHG | HEART RATE: 57 BPM | DIASTOLIC BLOOD PRESSURE: 59 MMHG | HEIGHT: 68 IN | BODY MASS INDEX: 35.28 KG/M2 | WEIGHT: 232.8 LBS

## 2024-11-11 DIAGNOSIS — R35.1 NOCTURIA: ICD-10-CM

## 2024-11-11 DIAGNOSIS — N13.8 BPH WITH OBSTRUCTION/LOWER URINARY TRACT SYMPTOMS: Primary | ICD-10-CM

## 2024-11-11 DIAGNOSIS — N40.1 BPH WITH OBSTRUCTION/LOWER URINARY TRACT SYMPTOMS: Primary | ICD-10-CM

## 2024-11-11 LAB
POC APPEARANCE, URINE: CLEAR
POC BILIRUBIN, URINE: NEGATIVE
POC BLOOD, URINE: ABNORMAL
POC COLOR, URINE: YELLOW
POC GLUCOSE, URINE: NEGATIVE MG/DL
POC KETONES, URINE: NEGATIVE MG/DL
POC LEUKOCYTES, URINE: ABNORMAL
POC NITRITE,URINE: NEGATIVE
POC PH, URINE: 6 PH
POC PROTEIN, URINE: NEGATIVE MG/DL
POC SPECIFIC GRAVITY, URINE: <=1.005
POC UROBILINOGEN, URINE: 0.2 EU/DL

## 2024-11-11 PROCEDURE — 1160F RVW MEDS BY RX/DR IN RCRD: CPT | Performed by: UROLOGY

## 2024-11-11 PROCEDURE — 81003 URINALYSIS AUTO W/O SCOPE: CPT | Performed by: UROLOGY

## 2024-11-11 PROCEDURE — 3008F BODY MASS INDEX DOCD: CPT | Performed by: UROLOGY

## 2024-11-11 PROCEDURE — 99214 OFFICE O/P EST MOD 30 MIN: CPT | Performed by: UROLOGY

## 2024-11-11 PROCEDURE — 1123F ACP DISCUSS/DSCN MKR DOCD: CPT | Performed by: UROLOGY

## 2024-11-11 PROCEDURE — 3078F DIAST BP <80 MM HG: CPT | Performed by: UROLOGY

## 2024-11-11 PROCEDURE — 1159F MED LIST DOCD IN RCRD: CPT | Performed by: UROLOGY

## 2024-11-11 PROCEDURE — G2211 COMPLEX E/M VISIT ADD ON: HCPCS | Performed by: UROLOGY

## 2024-11-11 PROCEDURE — 1036F TOBACCO NON-USER: CPT | Performed by: UROLOGY

## 2024-11-11 PROCEDURE — 3074F SYST BP LT 130 MM HG: CPT | Performed by: UROLOGY

## 2024-11-11 RX ORDER — DOXYCYCLINE 50 MG/1
1 TABLET ORAL 2 TIMES DAILY
COMMUNITY
Start: 2024-10-23

## 2024-11-11 RX ORDER — FINASTERIDE 5 MG/1
5 TABLET, FILM COATED ORAL DAILY
Qty: 90 TABLET | Refills: 3 | Status: SHIPPED | OUTPATIENT
Start: 2024-11-11 | End: 2025-11-11

## 2024-11-11 RX ORDER — METHOCARBAMOL 500 MG/1
500 TABLET, FILM COATED ORAL
COMMUNITY
Start: 2024-11-06

## 2024-11-11 NOTE — PROGRESS NOTES
PRIOR NOTES  74-year-old male seeing me regarding BPH and lower urinary tract symptoms  PMH: Hypertension, hyperlipidemia, claudication, thrombocytopenia, BMI 35, type 2 diabetes, depression, Parkinson's, sleep apnea  Last seen by Luz Marina Hsieh CNP in 2020.  Patient had UroLift in 2019  Pt reports incomplete emptying and frequency.   Endorses: occasional hesitancy, weak stream, intermittency, straining, frequency, urgency, dribbling w/ urgency.   Denies: Hematuria  NTF: 4x - wears CPAP  On tamsulosin 0.4mg at bedtime since 2022  UA 05/2024 no RBCs, culture negative  05/2024 - 3.28  **Increase tamsulosin to twice daily, started finasteride 5 mg daily    UPDATED SUBJECTIVE HISTORY  11/11/24 - Pt reports some improvement in frequency and stream. No straining and hematuria.   PVR - 36mL  IPSS 15  QOL 3/6    Past Medical History  He has a past medical history of Asymptomatic varicose veins of unspecified lower extremity (12/03/2019), Body mass index (BMI) 36.0-36.9, adult, Chronic rhinitis (01/18/2018), Cyanosis (11/10/2021), Dizziness and giddiness (11/16/2021), Dysphonia (05/19/2022), Encounter for general adult medical examination without abnormal findings (11/10/2021), Encounter for screening for malignant neoplasm of prostate (11/10/2021), Essential (primary) hypertension (02/07/2022), Localized swelling, mass and lump, neck (11/10/2021), Morbid (severe) obesity due to excess calories (Multi) (05/19/2022), Obesity, unspecified, Obesity, unspecified (08/15/2022), Other specified abnormal findings of blood chemistry (11/10/2021), Other specified postprocedural states (11/10/2021), Other specified soft tissue disorders (12/03/2019), Pain in right shoulder (05/19/2022), Pain in unspecified shoulder, Personal history of other diseases of the circulatory system (05/09/2022), Personal history of other diseases of the circulatory system (04/29/2016), Personal history of other diseases of the nervous system and sense  organs (05/14/2021), Personal history of other diseases of the nervous system and sense organs (11/10/2021), Personal history of other diseases of the nervous system and sense organs (11/10/2021), Personal history of other diseases of urinary system (11/10/2021), Personal history of other endocrine, nutritional and metabolic disease (04/29/2016), Personal history of other specified conditions, Personal history of other specified conditions (01/18/2018), Personal history of other specified conditions (05/14/2021), Shortness of breath (11/16/2021), Syncope and collapse (05/10/2022), Tremor, unspecified (06/22/2021), Type 2 diabetes mellitus (Multi) (02/14/2024), Unspecified abnormal findings in urine (11/10/2021), Unspecified disturbances of skin sensation (05/13/2021), and Varicose veins of right lower extremity with inflammation (12/03/2019).    Surgical History  He has a past surgical history that includes Cataract extraction (04/29/2016); Gallbladder surgery (04/29/2016); Other surgical history (11/10/2021); Other surgical history (11/10/2021); and Other surgical history (11/10/2021).     Social History  He reports that he has never smoked. He has been exposed to tobacco smoke. He has never used smokeless tobacco. He reports that he does not currently use alcohol. He reports that he does not use drugs.    Family History  Family History   Problem Relation Name Age of Onset    Hypertension Mother      Diabetes Father      Other (cardiac disorder) Father      Other (arteriosclerotic cardiovascular disease) Father      Coronary artery disease Father      Coronary artery disease Sister      Other (malignant neoplasm of uterus) Sister      Heart failure Brother          Allergies  Cefuroxime axetil, Hydrocortisone-iodoquinol-aloe, Metoprolol, Penicillins, and Simvastatin    ROS: 12 system review was completed and is negative with the exception of those signs and symptoms noted in the history of present illness: A 12  "system review was completed and is negative with the exception of those signs and symptoms noted in the history of present illness.     Exam:  General: in NAD, appears stated age  Head: normocephalic, atraumatic  Respiratory: normal effort, no use of accessory muscles  Cardiovascular: no edema noted  Skin: normal turgor, no rashes  Neurologic: grossly intact, oriented to person/place/time  Psychiatric: mode and affect appropriate  Abdomen: Soft, nontender, no palpable hernias, groin palpated, external inguinal ring palpated, no palpable hernias     Last Recorded Vitals  Blood pressure 102/59, pulse 57, height 1.715 m (5' 7.5\"), weight 106 kg (232 lb 12.8 oz).    Lab Results   Component Value Date    PSASCREEN 3.28 05/09/2024    CREATININE 0.84 05/09/2024    HGB 14.5 05/09/2024         ASSESSMENT/PLAN:  # Lower urinary tract symptoms  -Suspect combination of BPH and Parkinson's related bladder changes, neurogenic bladder  -Patient stopped taking finasteride, restart finasteride  -Switch tamsulosin to 0.8 mg nightly considering he has had some lightheadedness with morning and evening doses  -Discussed lifestyle modification for nocturia    Follow-up with me in 6 months    Jarrett Tarango MD    "

## 2024-11-12 ENCOUNTER — APPOINTMENT (OUTPATIENT)
Dept: PRIMARY CARE | Facility: CLINIC | Age: 74
End: 2024-11-12
Payer: MEDICARE

## 2024-11-12 VITALS
HEIGHT: 68 IN | HEART RATE: 59 BPM | SYSTOLIC BLOOD PRESSURE: 110 MMHG | DIASTOLIC BLOOD PRESSURE: 64 MMHG | WEIGHT: 234 LBS | TEMPERATURE: 97.9 F | BODY MASS INDEX: 35.46 KG/M2

## 2024-11-12 DIAGNOSIS — M54.10 RADICULOPATHY OF ARM: ICD-10-CM

## 2024-11-12 DIAGNOSIS — M54.2 CERVICAL SPINE PAIN: Primary | ICD-10-CM

## 2024-11-12 PROCEDURE — 1159F MED LIST DOCD IN RCRD: CPT | Performed by: INTERNAL MEDICINE

## 2024-11-12 PROCEDURE — 1160F RVW MEDS BY RX/DR IN RCRD: CPT | Performed by: INTERNAL MEDICINE

## 2024-11-12 PROCEDURE — 3008F BODY MASS INDEX DOCD: CPT | Performed by: INTERNAL MEDICINE

## 2024-11-12 PROCEDURE — G2211 COMPLEX E/M VISIT ADD ON: HCPCS | Performed by: INTERNAL MEDICINE

## 2024-11-12 PROCEDURE — 1158F ADVNC CARE PLAN TLK DOCD: CPT | Performed by: INTERNAL MEDICINE

## 2024-11-12 PROCEDURE — 3074F SYST BP LT 130 MM HG: CPT | Performed by: INTERNAL MEDICINE

## 2024-11-12 PROCEDURE — 99213 OFFICE O/P EST LOW 20 MIN: CPT | Performed by: INTERNAL MEDICINE

## 2024-11-12 PROCEDURE — 3078F DIAST BP <80 MM HG: CPT | Performed by: INTERNAL MEDICINE

## 2024-11-12 PROCEDURE — 1123F ACP DISCUSS/DSCN MKR DOCD: CPT | Performed by: INTERNAL MEDICINE

## 2024-11-12 RX ORDER — GABAPENTIN 100 MG/1
100 CAPSULE ORAL 3 TIMES DAILY
Qty: 90 CAPSULE | Refills: 5 | Status: SHIPPED | OUTPATIENT
Start: 2024-11-12 | End: 2024-12-20

## 2024-11-12 ASSESSMENT — ENCOUNTER SYMPTOMS
PALPITATIONS: 0
NUMBNESS: 1
ABDOMINAL PAIN: 0
BACK PAIN: 1
HEADACHES: 0
FEVER: 0
NECK PAIN: 1
DIFFICULTY URINATING: 0
NAUSEA: 0
FATIGUE: 0
DIZZINESS: 0
SHORTNESS OF BREATH: 0
VOMITING: 0
WEAKNESS: 0

## 2024-11-12 NOTE — PROGRESS NOTES
Subjective   Patient ID: Patricio Mae is a 74 y.o. male who presents for Follow-up (No other questions or concerns).    HPI   Patient presents with complaints of bilateral neck pain, location on both his anterior and posterior and spine, exacerbated by rotation of his neck. He also endorses occasional numbness and tingling into his left arm, particularly on waking in the morning. He was previously seen here in September 2024 and had an MRI performed. This demonstrated multilevel degenerative disc disease, facet arthrosis, C3-C4 ane C4-C5 moderate spinal canal stenosis, and C3-C4 foraminal stenosis severe on the left, moderate on the right. At that time, it was recommended that patient receive neurosurgery evaluation, however patient states that he did not receive these results or this recommendation prior to today's visit.     He endorses some pain in his left temporal region, alongside dry eye, reduce salivation, and occasional pain with chewing. However, [facility] and is currently being worked up for these problems.    Patient has been seen by Dr. Coates in the past for low back and hip pain which he continues to endorse. Physical therapy was offered at his last visit which patient has not followed up on.    Review of Systems   Constitutional:  Negative for fatigue and fever.   HENT:          Positive for dry mouth   Eyes:  Negative for visual disturbance.        Positive for eye dryness   Respiratory:  Negative for shortness of breath.    Cardiovascular:  Negative for chest pain, palpitations and leg swelling.   Gastrointestinal:  Negative for abdominal pain, nausea and vomiting.   Genitourinary:  Negative for difficulty urinating.   Musculoskeletal:  Positive for back pain and neck pain.   Neurological:  Positive for numbness. Negative for dizziness, weakness and headaches.       Objective   /64 (BP Location: Left arm, Patient Position: Sitting, BP  "Cuff Size: Large adult)   Pulse 59   Temp 36.6 °C (97.9 °F)   Ht 1.715 m (5' 7.5\")   Wt 106 kg (234 lb)   BMI 36.11 kg/m²     Physical Exam  Constitutional:       General: He is not in acute distress.     Appearance: He is obese.   HENT:      Head: Normocephalic and atraumatic.      Nose: Nose normal.      Mouth/Throat:      Mouth: Mucous membranes are moist.   Eyes:      General: No scleral icterus.     Extraocular Movements: Extraocular movements intact.   Neck:      Thyroid: No thyroid mass or thyromegaly.   Cardiovascular:      Rate and Rhythm: Normal rate and regular rhythm.      Heart sounds: Normal heart sounds. No murmur heard.     No friction rub. No gallop.   Pulmonary:      Effort: Pulmonary effort is normal.      Breath sounds: Normal breath sounds. No wheezing, rhonchi or rales.   Abdominal:      General: There is no distension.      Tenderness: There is no abdominal tenderness.   Musculoskeletal:         General: No swelling or deformity. Normal range of motion.      Cervical back: No rigidity. Pain with movement present. No spinous process tenderness or muscular tenderness. Normal range of motion.   Skin:     General: Skin is warm and dry.      Coloration: Skin is not jaundiced.   Neurological:      General: No focal deficit present.      Mental Status: He is alert and oriented to person, place, and time.   Psychiatric:         Mood and Affect: Mood normal.         Behavior: Behavior normal.         Assessment/Plan   Problem List Items Addressed This Visit    None  Visit Diagnoses         Codes    Cervical spine pain    -  Primary M54.2    Relevant Medications    gabapentin (Neurontin) 100 mg capsule    Radiculopathy of arm     M54.10    Relevant Medications    gabapentin (Neurontin) 100 mg capsule          Cervical spine pain  2. Cervical spinal canal stenosis  3. Cervical spinal foraminal stenosis  MRI findings discussed with patient. [facility]. Encouraged him " to attempt to find referral for neurosurgery evaluation given impact on spinal canal. Will prescribe Gabapentin 100 mg to address neuro symptoms including paresthesias. Patient instructed to take at night for 3 days, BID for 3 days, then TID.  WE reprinted neurosurgery referral from 10/7/2024.     Follow up in 6 months or sooner as needed with worsening symptoms. He has labs pending including Vitamin D, Lipids, CMP, HgA1c, and TSH. Re-printed these orders and provided to patient. Can discuss results on next visit.

## 2024-11-13 ENCOUNTER — LAB (OUTPATIENT)
Dept: LAB | Facility: LAB | Age: 74
End: 2024-11-13
Payer: MEDICARE

## 2024-11-13 DIAGNOSIS — E78.2 MIXED HYPERLIPIDEMIA: ICD-10-CM

## 2024-11-13 DIAGNOSIS — R73.03 PREDIABETES: ICD-10-CM

## 2024-11-13 DIAGNOSIS — I10 PRIMARY HYPERTENSION: ICD-10-CM

## 2024-11-13 DIAGNOSIS — E55.9 VITAMIN D DEFICIENCY: ICD-10-CM

## 2024-11-13 DIAGNOSIS — E06.3 HYPOTHYROIDISM DUE TO HASHIMOTO'S THYROIDITIS: ICD-10-CM

## 2024-11-13 LAB
25(OH)D3 SERPL-MCNC: 51 NG/ML (ref 30–100)
ALBUMIN SERPL BCP-MCNC: 4.2 G/DL (ref 3.4–5)
ALP SERPL-CCNC: 86 U/L (ref 33–136)
ALT SERPL W P-5'-P-CCNC: 6 U/L (ref 10–52)
ANION GAP SERPL CALC-SCNC: 7 MMOL/L (ref 10–20)
AST SERPL W P-5'-P-CCNC: 18 U/L (ref 9–39)
BILIRUB SERPL-MCNC: 1.6 MG/DL (ref 0–1.2)
BUN SERPL-MCNC: 13 MG/DL (ref 6–23)
CALCIUM SERPL-MCNC: 9.4 MG/DL (ref 8.6–10.3)
CHLORIDE SERPL-SCNC: 104 MMOL/L (ref 98–107)
CHOLEST SERPL-MCNC: 132 MG/DL (ref 0–199)
CHOLESTEROL/HDL RATIO: 3.2
CO2 SERPL-SCNC: 31 MMOL/L (ref 21–32)
CREAT SERPL-MCNC: 0.89 MG/DL (ref 0.5–1.3)
EGFRCR SERPLBLD CKD-EPI 2021: 90 ML/MIN/1.73M*2
GLUCOSE SERPL-MCNC: 90 MG/DL (ref 74–99)
HDLC SERPL-MCNC: 41 MG/DL
LDLC SERPL CALC-MCNC: 60 MG/DL
NON HDL CHOLESTEROL: 91 MG/DL (ref 0–149)
POTASSIUM SERPL-SCNC: 4.2 MMOL/L (ref 3.5–5.3)
PROT SERPL-MCNC: 6.8 G/DL (ref 6.4–8.2)
SODIUM SERPL-SCNC: 138 MMOL/L (ref 136–145)
TRIGL SERPL-MCNC: 153 MG/DL (ref 0–149)
TSH SERPL-ACNC: 3.03 MIU/L (ref 0.44–3.98)
VLDL: 31 MG/DL (ref 0–40)

## 2024-11-13 PROCEDURE — 84443 ASSAY THYROID STIM HORMONE: CPT

## 2024-11-13 PROCEDURE — 80053 COMPREHEN METABOLIC PANEL: CPT

## 2024-11-13 PROCEDURE — 80061 LIPID PANEL: CPT

## 2024-11-13 PROCEDURE — 82306 VITAMIN D 25 HYDROXY: CPT

## 2024-11-13 PROCEDURE — 83036 HEMOGLOBIN GLYCOSYLATED A1C: CPT

## 2024-11-13 PROCEDURE — 36415 COLL VENOUS BLD VENIPUNCTURE: CPT

## 2024-11-14 LAB
EST. AVERAGE GLUCOSE BLD GHB EST-MCNC: 103 MG/DL
HBA1C MFR BLD: 5.2 %

## 2024-11-14 NOTE — RESULT ENCOUNTER NOTE
Labs showed normal sugar and average sugar levels, normal thyroid and vitamin D. Cholesterol was normal with the exception of mild elevation of triglycerides but improved from last labs.

## 2024-12-17 ENCOUNTER — TELEPHONE (OUTPATIENT)
Dept: PRIMARY CARE | Facility: CLINIC | Age: 74
End: 2024-12-17
Payer: MEDICARE

## 2024-12-20 DIAGNOSIS — M54.10 RADICULOPATHY OF ARM: ICD-10-CM

## 2024-12-20 DIAGNOSIS — M54.2 CERVICAL SPINE PAIN: Primary | ICD-10-CM

## 2024-12-20 RX ORDER — GABAPENTIN 300 MG/1
300 CAPSULE ORAL 3 TIMES DAILY
Qty: 90 CAPSULE | Refills: 5 | Status: SHIPPED | OUTPATIENT
Start: 2024-12-20 | End: 2025-06-18

## 2025-01-04 DIAGNOSIS — E55.9 VITAMIN D DEFICIENCY: ICD-10-CM

## 2025-01-13 ENCOUNTER — APPOINTMENT (OUTPATIENT)
Dept: PRIMARY CARE | Facility: CLINIC | Age: 75
End: 2025-01-13
Payer: MEDICARE

## 2025-01-14 DIAGNOSIS — E55.9 VITAMIN D DEFICIENCY: ICD-10-CM

## 2025-01-14 RX ORDER — ERGOCALCIFEROL 1.25 MG/1
1 CAPSULE ORAL
Qty: 12 CAPSULE | Refills: 0 | Status: SHIPPED | OUTPATIENT
Start: 2025-01-19

## 2025-01-21 RX ORDER — ERGOCALCIFEROL 1.25 MG/1
1 CAPSULE ORAL
Qty: 12 CAPSULE | Refills: 0 | Status: SHIPPED | OUTPATIENT
Start: 2025-01-26

## 2025-01-23 ENCOUNTER — HOSPITAL ENCOUNTER (OUTPATIENT)
Dept: RADIOLOGY | Facility: CLINIC | Age: 75
Discharge: HOME | End: 2025-01-23
Payer: MEDICARE

## 2025-01-23 ENCOUNTER — APPOINTMENT (OUTPATIENT)
Dept: PRIMARY CARE | Facility: CLINIC | Age: 75
End: 2025-01-23
Payer: MEDICARE

## 2025-01-23 VITALS
WEIGHT: 238 LBS | DIASTOLIC BLOOD PRESSURE: 77 MMHG | HEIGHT: 68 IN | SYSTOLIC BLOOD PRESSURE: 133 MMHG | BODY MASS INDEX: 36.07 KG/M2 | TEMPERATURE: 97.3 F | HEART RATE: 48 BPM

## 2025-01-23 DIAGNOSIS — M54.2 CERVICAL SPINE PAIN: ICD-10-CM

## 2025-01-23 DIAGNOSIS — E04.1 SOLITARY THYROID NODULE: Primary | ICD-10-CM

## 2025-01-23 DIAGNOSIS — M54.10 RADICULOPATHY OF ARM: ICD-10-CM

## 2025-01-23 DIAGNOSIS — R07.89 CHEST PRESSURE: ICD-10-CM

## 2025-01-23 DIAGNOSIS — R73.03 PREDIABETES: ICD-10-CM

## 2025-01-23 DIAGNOSIS — E06.3 HYPOTHYROIDISM DUE TO HASHIMOTO'S THYROIDITIS: ICD-10-CM

## 2025-01-23 DIAGNOSIS — E78.2 MIXED HYPERLIPIDEMIA: ICD-10-CM

## 2025-01-23 DIAGNOSIS — R25.2 CALF CRAMP: ICD-10-CM

## 2025-01-23 DIAGNOSIS — M79.89 LEFT LEG SWELLING: ICD-10-CM

## 2025-01-23 DIAGNOSIS — R94.31 ABNORMAL ECG: ICD-10-CM

## 2025-01-23 DIAGNOSIS — I20.89 ANGINA OF EFFORT (CMS-HCC): ICD-10-CM

## 2025-01-23 DIAGNOSIS — I10 PRIMARY HYPERTENSION: ICD-10-CM

## 2025-01-23 PROCEDURE — 3008F BODY MASS INDEX DOCD: CPT | Performed by: INTERNAL MEDICINE

## 2025-01-23 PROCEDURE — 1160F RVW MEDS BY RX/DR IN RCRD: CPT | Performed by: INTERNAL MEDICINE

## 2025-01-23 PROCEDURE — 1159F MED LIST DOCD IN RCRD: CPT | Performed by: INTERNAL MEDICINE

## 2025-01-23 PROCEDURE — 93971 EXTREMITY STUDY: CPT

## 2025-01-23 PROCEDURE — 1123F ACP DISCUSS/DSCN MKR DOCD: CPT | Performed by: INTERNAL MEDICINE

## 2025-01-23 PROCEDURE — 3078F DIAST BP <80 MM HG: CPT | Performed by: INTERNAL MEDICINE

## 2025-01-23 PROCEDURE — 3075F SYST BP GE 130 - 139MM HG: CPT | Performed by: INTERNAL MEDICINE

## 2025-01-23 PROCEDURE — 99214 OFFICE O/P EST MOD 30 MIN: CPT | Performed by: INTERNAL MEDICINE

## 2025-01-23 PROCEDURE — 1158F ADVNC CARE PLAN TLK DOCD: CPT | Performed by: INTERNAL MEDICINE

## 2025-01-23 PROCEDURE — 93971 EXTREMITY STUDY: CPT | Performed by: RADIOLOGY

## 2025-01-23 PROCEDURE — G2211 COMPLEX E/M VISIT ADD ON: HCPCS | Performed by: INTERNAL MEDICINE

## 2025-01-23 PROCEDURE — 93000 ELECTROCARDIOGRAM COMPLETE: CPT | Performed by: INTERNAL MEDICINE

## 2025-01-23 ASSESSMENT — PATIENT HEALTH QUESTIONNAIRE - PHQ9
2. FEELING DOWN, DEPRESSED OR HOPELESS: NOT AT ALL
1. LITTLE INTEREST OR PLEASURE IN DOING THINGS: NOT AT ALL
SUM OF ALL RESPONSES TO PHQ9 QUESTIONS 1 AND 2: 0

## 2025-01-23 NOTE — PROGRESS NOTES
"Subjective   Patient ID: Patricio Mae is a 74 y.o. male who presents for Follow-up (Routine. No other questions or concerns).    HPI [facility].  Patient is on methocarbamol for the neck pain.  Patient started on gabapentin 100 mg on 11/12/2024.  The dose was not helping completely so we increased the dose to 300mg one month ago. Patient complains of general lack of energy. Patient states he was shoveling snow 2 weeks ago and developed SOB and chest pressure that resolved with 2 nitroglycerin tablets. He has not had any further episodes. He has an appointment with Dr. Long next month. He will notice SOB with going up and down steps.  We discussed with Dr. Long who advised a nuclear stress test for further evaluation at this time.  He is also describing left lower extremity swelling and cramps.  Patient requested a CT calcium score test.  Patient has history of left-sided thyroid nodule that needs follow-up with ultrasound at this time.    Review of Systems   Constitutional:  Negative for chills and fever.   HENT:  Negative for sore throat.    Eyes:  Negative for visual disturbance.   Respiratory:  Positive for shortness of breath (2 weeks ago resolved.). Negative for cough.    Cardiovascular:  Positive for chest pain (2 weeks ago resolved). Negative for palpitations and leg swelling.   Gastrointestinal:  Negative for abdominal pain, blood in stool, constipation, diarrhea, nausea and vomiting.   Genitourinary:  Negative for dysuria.   Musculoskeletal:  Positive for neck pain.   Skin:  Negative for rash.   Neurological:  Negative for dizziness, syncope and light-headedness.   Psychiatric/Behavioral:  Negative for agitation and confusion.        Objective   /77 (BP Location: Right arm, Patient Position: Sitting, BP Cuff Size: Adult)   Pulse (!) 48   Temp 36.3 °C (97.3 °F)   Ht 1.715 m (5' 7.5\")   Wt 108 kg (238 lb)   BMI 36.73 kg/m² "     Physical Exam  Vitals and nursing note reviewed.   Constitutional:       General: He is not in acute distress.     Appearance: Normal appearance. He is not ill-appearing, toxic-appearing or diaphoretic.   HENT:      Head: Normocephalic and atraumatic.      Mouth/Throat:      Mouth: Mucous membranes are moist.      Pharynx: Oropharynx is clear. No oropharyngeal exudate.   Eyes:      Pupils: Pupils are equal, round, and reactive to light.   Cardiovascular:      Rate and Rhythm: Normal rate and regular rhythm.      Heart sounds: Normal heart sounds.   Pulmonary:      Effort: Pulmonary effort is normal. No respiratory distress.      Breath sounds: Normal breath sounds. No wheezing, rhonchi or rales.   Abdominal:      General: There is no distension.      Palpations: Abdomen is soft.      Tenderness: There is no abdominal tenderness. There is no guarding.   Musculoskeletal:      Cervical back: Neck supple.      Right lower leg: No edema.      Left lower leg: Edema present.   Lymphadenopathy:      Cervical: No cervical adenopathy.   Skin:     General: Skin is warm and dry.      Coloration: Skin is not jaundiced or pale.      Findings: No erythema or rash.   Neurological:      General: No focal deficit present.      Mental Status: He is alert and oriented to person, place, and time.      Cranial Nerves: No cranial nerve deficit.   Psychiatric:         Mood and Affect: Mood normal.         Behavior: Behavior normal.         Thought Content: Thought content normal.         Judgment: Judgment normal.         Assessment/Plan   Problem List Items Addressed This Visit           ICD-10-CM    HTN (hypertension) I10    Relevant Orders    CT cardiac scoring wo IV contrast (Completed)    Hyperlipidemia E78.5    Relevant Orders    CT cardiac scoring wo IV contrast (Completed)    Solitary thyroid nodule - Primary E04.1    Relevant Orders    US thyroid (Completed)    Prediabetes R73.03    Relevant Orders    CT cardiac scoring wo IV  contrast (Completed)    Left leg swelling M79.89    Relevant Orders    Lower extremity venous duplex left (Completed)    Referral to Vascular Medicine    Calf cramp R25.2    Relevant Orders    Lower extremity venous duplex left (Completed)    Referral to Vascular Medicine    Chest pressure R07.89    Relevant Orders    ECG 12 lead (Clinic Performed) (Completed)    Angina of effort ([facility]) I20.89    Relevant Orders    Nuclear Stress Test (Completed)    Abnormal ECG R94.31    Relevant Orders    Nuclear Stress Test (Completed)     Solitary thyroid nodule: Will recheck ultrasound of the thyroid for further evaluation    Left leg swelling/calf cramp: Will order a venous duplex stat for further evaluation and have also referred the patient to vascular medicine.    Chest pressure/angina: We have ordered a nuclear stress test at the recommendation of cardiology did not EKG in the office today showed no acute ST segment elevation or depression.  Patient has appropriate follow-up with cardiology as well.  If he develops any persistent chest pain to go to the ER.    Prediabetes/hypertension/hyperlipidemia: Will check a CT calcium score at the patient's request    Hypertension: Chronic, stable.  Continue amlodipine and lisinopril    Hyperlipidemia: Chronic, stable continue rosuvastatin    Hypothyroidism: Chronic, stable continue levothyroxine check an ultrasound of thyroid    Chronic cervical pain: Chronic, stable continue gabapentin

## 2025-01-27 ENCOUNTER — APPOINTMENT (OUTPATIENT)
Dept: RADIOLOGY | Facility: CLINIC | Age: 75
End: 2025-01-27
Payer: MEDICARE

## 2025-01-27 ENCOUNTER — APPOINTMENT (OUTPATIENT)
Dept: CARDIOLOGY | Facility: CLINIC | Age: 75
End: 2025-01-27
Payer: MEDICARE

## 2025-02-03 ENCOUNTER — HOSPITAL ENCOUNTER (OUTPATIENT)
Dept: RADIOLOGY | Facility: HOSPITAL | Age: 75
Discharge: HOME | End: 2025-02-03
Payer: MEDICARE

## 2025-02-03 ENCOUNTER — HOSPITAL ENCOUNTER (OUTPATIENT)
Dept: CARDIOLOGY | Facility: HOSPITAL | Age: 75
Discharge: HOME | End: 2025-02-03
Payer: MEDICARE

## 2025-02-03 DIAGNOSIS — R94.31 ABNORMAL ECG: ICD-10-CM

## 2025-02-03 DIAGNOSIS — I10 PRIMARY HYPERTENSION: ICD-10-CM

## 2025-02-03 DIAGNOSIS — I20.89 ANGINA OF EFFORT (CMS-HCC): ICD-10-CM

## 2025-02-03 PROCEDURE — 93018 CV STRESS TEST I&R ONLY: CPT | Performed by: INTERNAL MEDICINE

## 2025-02-03 PROCEDURE — 93017 CV STRESS TEST TRACING ONLY: CPT

## 2025-02-03 PROCEDURE — 78452 HT MUSCLE IMAGE SPECT MULT: CPT | Performed by: STUDENT IN AN ORGANIZED HEALTH CARE EDUCATION/TRAINING PROGRAM

## 2025-02-03 PROCEDURE — 93016 CV STRESS TEST SUPVJ ONLY: CPT | Performed by: INTERNAL MEDICINE

## 2025-02-03 PROCEDURE — A9502 TC99M TETROFOSMIN: HCPCS | Performed by: INTERNAL MEDICINE

## 2025-02-03 PROCEDURE — 78452 HT MUSCLE IMAGE SPECT MULT: CPT

## 2025-02-03 PROCEDURE — 3430000001 HC RX 343 DIAGNOSTIC RADIOPHARMACEUTICALS: Performed by: INTERNAL MEDICINE

## 2025-02-03 RX ADMIN — TETROFOSMIN 33.3 MILLICURIE: 0.23 INJECTION, POWDER, LYOPHILIZED, FOR SOLUTION INTRAVENOUS at 10:45

## 2025-02-03 RX ADMIN — TETROFOSMIN 9.5 MILLICURIE: 0.23 INJECTION, POWDER, LYOPHILIZED, FOR SOLUTION INTRAVENOUS at 09:10

## 2025-02-04 RX ORDER — AMLODIPINE BESYLATE 2.5 MG/1
2.5 TABLET ORAL DAILY
Qty: 90 TABLET | Refills: 3 | Status: SHIPPED | OUTPATIENT
Start: 2025-02-04 | End: 2026-02-04

## 2025-02-05 ENCOUNTER — APPOINTMENT (OUTPATIENT)
Dept: RADIOLOGY | Facility: HOSPITAL | Age: 75
End: 2025-02-05
Payer: MEDICARE

## 2025-02-05 ENCOUNTER — APPOINTMENT (OUTPATIENT)
Dept: CARDIOLOGY | Facility: HOSPITAL | Age: 75
End: 2025-02-05
Payer: MEDICARE

## 2025-02-06 ENCOUNTER — APPOINTMENT (OUTPATIENT)
Dept: RADIOLOGY | Facility: HOSPITAL | Age: 75
End: 2025-02-06
Payer: MEDICARE

## 2025-02-06 DIAGNOSIS — E06.3 HYPOTHYROIDISM DUE TO HASHIMOTO'S THYROIDITIS: ICD-10-CM

## 2025-02-06 RX ORDER — LEVOTHYROXINE SODIUM 25 UG/1
25 TABLET ORAL DAILY
Qty: 90 TABLET | Refills: 3 | Status: SHIPPED | OUTPATIENT
Start: 2025-02-06

## 2025-02-13 ENCOUNTER — HOSPITAL ENCOUNTER (OUTPATIENT)
Dept: RADIOLOGY | Facility: HOSPITAL | Age: 75
Discharge: HOME | End: 2025-02-13
Payer: MEDICARE

## 2025-02-13 DIAGNOSIS — E04.1 SOLITARY THYROID NODULE: ICD-10-CM

## 2025-02-13 PROCEDURE — 76536 US EXAM OF HEAD AND NECK: CPT

## 2025-02-25 ENCOUNTER — APPOINTMENT (OUTPATIENT)
Dept: CARDIOLOGY | Facility: CLINIC | Age: 75
End: 2025-02-25
Payer: MEDICARE

## 2025-02-27 ENCOUNTER — OFFICE VISIT (OUTPATIENT)
Dept: CARDIOLOGY | Facility: CLINIC | Age: 75
End: 2025-02-27
Payer: MEDICARE

## 2025-02-27 VITALS
SYSTOLIC BLOOD PRESSURE: 130 MMHG | HEIGHT: 68 IN | HEART RATE: 48 BPM | WEIGHT: 235 LBS | BODY MASS INDEX: 35.61 KG/M2 | DIASTOLIC BLOOD PRESSURE: 72 MMHG

## 2025-02-27 DIAGNOSIS — I10 PRIMARY HYPERTENSION: Primary | ICD-10-CM

## 2025-02-27 DIAGNOSIS — R00.1 SINUS BRADYCARDIA: ICD-10-CM

## 2025-02-27 DIAGNOSIS — R60.0 EDEMA OF LOWER EXTREMITY: ICD-10-CM

## 2025-02-27 DIAGNOSIS — R06.02 SHORTNESS OF BREATH: ICD-10-CM

## 2025-02-27 DIAGNOSIS — I95.1 ORTHOSTATIC HYPOTENSION: ICD-10-CM

## 2025-02-27 DIAGNOSIS — Z98.890 HISTORY OF CARDIAC CATH: ICD-10-CM

## 2025-02-27 ASSESSMENT — ENCOUNTER SYMPTOMS
NERVOUS/ANXIOUS: 1
PALPITATIONS: 0
ARTHRALGIAS: 1
DIZZINESS: 1
ABDOMINAL DISTENTION: 1
LIGHT-HEADEDNESS: 1

## 2025-02-27 NOTE — PROGRESS NOTES
Patient:  Patricio Mae  YOB: 1950  MRN: [id number]       Chief Complaint/Active Symptoms:       Patricio Mae is a 74 y.o. male who returns today for cardiac follow-up.    Here for annual follow-up. No recent chest pain or discomfort. Still has SOB when bending over and climbing stairs. Has chronic bilateral ankle edema L>R. No palpitations, syncope or near syncope. Always has a low heart rate. Still has lightheadedness with standing. Has vague chest pain. Primary ordered stress test in February and has CT cardiac scan ordered. Patient with history of cardiac cath with normal coronary arteries at [facility]. Cardiac cath was not saved to his EPIC chart when we transferred in 2022.    Review of Systems   Cardiovascular:  Positive for chest pain and leg swelling. Negative for palpitations.   Gastrointestinal:  Positive for abdominal distention.   Musculoskeletal:  Positive for arthralgias.   Neurological:  Positive for dizziness and light-headedness.   Psychiatric/Behavioral:  The patient is nervous/anxious.    All other systems reviewed and are negative.      Objective:     Vitals:    02/27/25 1002   BP: 130/72   Pulse: (!) 48       Vitals:    02/27/25 1002   Weight: 107 kg (235 lb)       Allergies:     Allergies   Allergen Reactions    Cefuroxime Axetil Unknown    Hydrocortisone-Iodoquinol-Aloe Unknown    Metoprolol Unknown    Penicillins Unknown    Simvastatin Unknown          Medications:     Current Outpatient Medications   Medication Instructions    amLODIPine (NORVASC) 2.5 mg, oral, Daily    aspirin 81 mg, Every other day    carbidopa-levodopa (Sinemet)  mg tablet 1.5 tablets, 4 times daily    doxycycline (Adoxa) 50 mg tablet 1 tablet, 2 times daily    ergocalciferol (VITAMIN D-2) 1,250 mcg, oral, Once Weekly    eyelid cleanser combination 5 pads, medicated Apply topically.  USE PADS EXTERNALLY TWICE A DAY AS DIRECTED FOR BLEPHARITIS    finasteride (PROSCAR) 5 mg, oral, Daily, Do not crush, chew,  or split.    fluorometholone (FML) 0.1 % ophthalmic suspension 1 drop, 2 times daily    fluticasone (Flonase) 50 mcg/actuation nasal spray 2 sprays, Daily    gabapentin (NEURONTIN) 300 mg, oral, 3 times daily    hydrocortisone 2.5 % cream Apply topically.  APPLY A SMALL AMOUNT EXTERNALLY TWICE A DAY AS NEEDED TO RASH OF THE FACE, AVOIDING THE EYELIDS. USE FOR UP TO TWO WEEKS, THEN TAKE A TWO WEEK BREAK FOR SEBORRHEIC DERMATITIS    ketoconazole (NIZOral) 2 % cream Apply topically.  APPLY A SUFFICIENT AMOUNT EXTERNALLY TWICE A DAY TO FLAKY RASH FO THE CENTRAL FACE FOR SEBORRHEIC DERMATITIS    levothyroxine (SYNTHROID, LEVOXYL) 25 mcg, oral, Daily    lidocaine (Lidoderm) 5 % patch 1 patch, transdermal, Daily, Apply to painful area 12 hours per day, remove for 12 hours.    lisinopril 20 mg, oral, Daily    methocarbamol (ROBAXIN) 500 mg    nitroglycerin (Nitrostat) 0.4 mg SL tablet 1 tablet, Every 5 min PRN    NON FORMULARY CLARITIN    potassium chloride CR 20 mEq ER tablet 20 mEq, oral, Daily    pramoxine (Sarna Sensitive) 1 % lotion Apply topically.  APPLY A SUFFICIENT AMOUNT EXTERNALLY FIVE TIMES A DAY AS NEEDED TO ITCHY AREAS OF THE TRUNK AND ARMS. CAN LEAVE IN FRIDGE FOR COOLING EFFECT FOR ITCHING J    rosuvastatin (Crestor) 10 mg tablet 1 tablet, Nightly    sertraline (Zoloft) 25 mg tablet 1 tablet, 2 times daily    tamsulosin (FLOMAX) 0.4 mg, oral, 2 times daily       Physical Examination:   GENERAL:  Well developed, well nourished, in no acute distress.  HEENT: NC AT, EOMI with anicteric sclera  NECK:  Supple, no JVD, no bruit.  CHEST:  Symmetric and nontender.  LUNGS:  Clear to auscultation bilaterally, normal respiratory effort.  HEART: PMI is not palpable.  There is a regular rhythm this bradycardic with a normal S1 and S2 without S3 or murmur.  ABDOMEN: Soft, NT, ND without palpable organomegaly or bruits  EXTREMITIES:  Warm with good color, no clubbing or cyanosis.  There is trace ankle edema  "noted.  PERIPHERAL VASCULAR:  Pulses present and equally palpable; 2+ throughout.  MUSCULOSKELETAL: Osteoarthritic changes  NEURO/PSYCH:  Alert and oriented times three with approppriate behavior and responses. Nonfocal motor examination with normal gait and ambulation  Lymph: No significant palpable lymphadenopathy  Skin: no rash or lesions on exposed skin or reported.    Lab:     CBC:   Lab Results   Component Value Date    WBC 6.5 05/09/2024    RBC 4.83 05/09/2024    HGB 14.5 05/09/2024    HCT 44.7 05/09/2024     05/09/2024        CMP:    Lab Results   Component Value Date     11/13/2024    K 4.2 11/13/2024     11/13/2024    CO2 31 11/13/2024    BUN 13 11/13/2024    CREATININE 0.89 11/13/2024    GLUCOSE 90 11/13/2024    CALCIUM 9.4 11/13/2024       Magnesium:    No results found for: \"MG\"    Lipid Profile:    Lab Results   Component Value Date    TRIG 153 (H) 11/13/2024    HDL 41.0 11/13/2024    LDLCALC 60 11/13/2024       TSH:    Lab Results   Component Value Date    TSH 3.03 11/13/2024       BNP:   Lab Results   Component Value Date    BNP 29 05/16/2023        PT/INR:    No results found for: \"PROTIME\", \"INR\"    HgBA1c:    Lab Results   Component Value Date    HGBA1C 5.2 11/13/2024       BMP:  Lab Results   Component Value Date     11/13/2024     05/09/2024     05/16/2023     04/28/2022     02/28/2022    K 4.2 11/13/2024    K 4.0 05/09/2024    K 4.3 05/16/2023    K 4.3 04/28/2022    K 4.0 02/28/2022     11/13/2024     05/09/2024     05/16/2023     04/28/2022     02/28/2022    CO2 31 11/13/2024    CO2 31 05/09/2024    CO2 30 05/16/2023    CO2 31 04/28/2022    CO2 30 02/28/2022    BUN 13 11/13/2024    BUN 14 05/09/2024    BUN 11 05/16/2023    BUN 13 04/28/2022    BUN 15 02/28/2022    CREATININE 0.89 11/13/2024    CREATININE 0.84 05/09/2024    CREATININE 0.95 05/16/2023    CREATININE 0.98 04/28/2022    CREATININE 0.99 02/28/2022 "       Cardiac Enzymes:    Lab Results   Component Value Date    TROPHS 9 04/28/2022    TROPHS 7 04/28/2022       Hepatic Function Panel:    Lab Results   Component Value Date    ALKPHOS 86 11/13/2024    ALT 6 (L) 11/13/2024    AST 18 11/13/2024    PROT 6.8 11/13/2024    BILITOT 1.6 (H) 11/13/2024         Diagnostic Studies:     No echocardiogram results found for the past 12 months    Nuclear stress test 2/3/25  IMPRESSION:  1. Negative perfusion study without evidence of inducible myocardial  ischemia or prior infarction.  2. The left ventricle is normal in size.  3. Normal LV wall motion with a post-stress LV EF estimated greater  than 65%.       EKG:   EKG today sinus bradycardia, LAD, criteria for septal MI, no change.     Radiology:         ASSESSMENT     Problem List Items Addressed This Visit       Edema of lower extremity    HTN (hypertension) - Primary    Relevant Orders    ECG 12 lead (Clinic Performed) (Completed)    Follow Up In Cardiology    Shortness of breath    Sinus bradycardia    History of cardiac cath    Orthostatic hypotension       PLAN   1.  History of chest pain.  The patient has a longstanding history of recurrent chest discomfort.  We requested a copy of the heart catheterization from  be placed in his chart.  This was done approximately 5 or 6 years ago.  It was normal.  He does not require additional cardiac testing for ischemic heart disease.  Given this he did have a normal nuclear stress test recently.  Would continue with just risk factor modification.  2.  Hypertension.  Blood pressures are adequately controlled.  Would avoid diuretics and aggressive blood pressure management given his orthostatic hypotension.  3.  Lightheadedness with orthostatic hypotension.  We have explained to the patient autonomic dysfunction associated with his Parkinson's disease.  We have encouraged him to maintain appropriate hydration which she has not been doing at home.  4.   Shortness of breath.  His shortness of breath is likely due to physical deconditioning and mild abdominal obesity when the shortness of breath when bending over.  We have encouraged just a mild walking program.  5.  Edema.  He has minimal edema.  We recommend no changes.    Will see the patient in follow-up in a year sooner if there is any problems.

## 2025-03-26 ENCOUNTER — HOSPITAL ENCOUNTER (OUTPATIENT)
Dept: RADIOLOGY | Facility: HOSPITAL | Age: 75
Discharge: HOME | End: 2025-03-26

## 2025-03-26 DIAGNOSIS — E78.2 MIXED HYPERLIPIDEMIA: ICD-10-CM

## 2025-03-26 DIAGNOSIS — I10 PRIMARY HYPERTENSION: ICD-10-CM

## 2025-03-26 DIAGNOSIS — R73.03 PREDIABETES: ICD-10-CM

## 2025-03-26 PROCEDURE — 75571 CT HRT W/O DYE W/CA TEST: CPT

## 2025-04-01 ENCOUNTER — TELEPHONE (OUTPATIENT)
Dept: PRIMARY CARE | Facility: CLINIC | Age: 75
End: 2025-04-01
Payer: MEDICARE

## 2025-04-01 DIAGNOSIS — E78.2 MIXED HYPERLIPIDEMIA: Primary | ICD-10-CM

## 2025-04-01 DIAGNOSIS — R93.1 AGATSTON CORONARY ARTERY CALCIUM SCORE GREATER THAN 400: ICD-10-CM

## 2025-04-01 RX ORDER — ROSUVASTATIN CALCIUM 20 MG/1
20 TABLET, COATED ORAL DAILY
Qty: 90 TABLET | Refills: 3 | Status: SHIPPED | OUTPATIENT
Start: 2025-04-01 | End: 2026-04-01

## 2025-04-01 NOTE — TELEPHONE ENCOUNTER
----- Message from Meir Duboisens sent at 4/1/2025  8:57 AM EDT -----  Patient's CT calcium score was 1025.  This indicates there is plaque associated with the arteries of the heart. This is considered a high score.  I discussed this with Dr. Long and since patient had recent negative stress test there is nothing   else that needs to be done except make sure he is on a high intensity statin cholesterol medication so we would recommended increasing his rosuvastatin or crestor to 20mg.  This will help prevent the further build up of plaque in the arteries and st  abilize plaque that is already present.

## 2025-04-17 PROBLEM — M54.2 CERVICAL SPINE PAIN: Status: ACTIVE | Noted: 2025-04-17

## 2025-04-17 PROBLEM — M54.10 RADICULOPATHY OF ARM: Status: ACTIVE | Noted: 2025-04-17

## 2025-04-24 DIAGNOSIS — I10 PRIMARY HYPERTENSION: ICD-10-CM

## 2025-04-24 RX ORDER — POTASSIUM CHLORIDE 20 MEQ/1
20 TABLET, EXTENDED RELEASE ORAL DAILY
Qty: 90 TABLET | Refills: 0 | Status: SHIPPED | OUTPATIENT
Start: 2025-04-24

## 2025-04-27 PROBLEM — I20.89 ANGINA OF EFFORT (CMS-HCC): Status: ACTIVE | Noted: 2025-04-27

## 2025-04-27 PROBLEM — M79.89 LEFT LEG SWELLING: Status: ACTIVE | Noted: 2025-04-27

## 2025-04-27 PROBLEM — R07.89 CHEST PRESSURE: Status: ACTIVE | Noted: 2025-04-27

## 2025-04-27 PROBLEM — R94.31 ABNORMAL ECG: Status: ACTIVE | Noted: 2025-04-27

## 2025-04-27 PROBLEM — E06.3 HYPOTHYROIDISM DUE TO HASHIMOTO'S THYROIDITIS: Status: ACTIVE | Noted: 2025-04-27

## 2025-04-27 PROBLEM — R25.2 CALF CRAMP: Status: ACTIVE | Noted: 2025-04-27

## 2025-04-27 ASSESSMENT — ENCOUNTER SYMPTOMS
CONFUSION: 0
COUGH: 0
NAUSEA: 0
PALPITATIONS: 0
BLOOD IN STOOL: 0
CHILLS: 0
NECK PAIN: 1
DIARRHEA: 0
CONSTIPATION: 0
SHORTNESS OF BREATH: 1
AGITATION: 0
FEVER: 0
LIGHT-HEADEDNESS: 0
VOMITING: 0
DYSURIA: 0
DIZZINESS: 0
ABDOMINAL PAIN: 0
SORE THROAT: 0

## 2025-05-05 ENCOUNTER — APPOINTMENT (OUTPATIENT)
Dept: PRIMARY CARE | Facility: CLINIC | Age: 75
End: 2025-05-05
Payer: MEDICARE

## 2025-05-05 VITALS
SYSTOLIC BLOOD PRESSURE: 156 MMHG | DIASTOLIC BLOOD PRESSURE: 75 MMHG | WEIGHT: 240.2 LBS | HEIGHT: 61 IN | TEMPERATURE: 97.2 F | BODY MASS INDEX: 45.35 KG/M2 | HEART RATE: 64 BPM

## 2025-05-05 DIAGNOSIS — E78.2 MIXED HYPERLIPIDEMIA: ICD-10-CM

## 2025-05-05 DIAGNOSIS — R73.03 PREDIABETES: ICD-10-CM

## 2025-05-05 DIAGNOSIS — Z86.79 HISTORY OF ORTHOSTATIC HYPOTENSION: ICD-10-CM

## 2025-05-05 DIAGNOSIS — E06.3 HYPOTHYROIDISM DUE TO HASHIMOTO'S THYROIDITIS: ICD-10-CM

## 2025-05-05 DIAGNOSIS — Z72.89 OTHER PROBLEMS RELATED TO LIFESTYLE: ICD-10-CM

## 2025-05-05 DIAGNOSIS — M54.2 CERVICAL SPINE PAIN: ICD-10-CM

## 2025-05-05 DIAGNOSIS — Z00.00 HEALTH MAINTENANCE EXAMINATION: Primary | ICD-10-CM

## 2025-05-05 DIAGNOSIS — G20.A1 PARKINSON'S DISEASE, UNSPECIFIED WHETHER DYSKINESIA PRESENT, UNSPECIFIED WHETHER MANIFESTATIONS FLUCTUATE: ICD-10-CM

## 2025-05-05 DIAGNOSIS — N40.1 BENIGN PROSTATIC HYPERPLASIA WITH URINARY OBSTRUCTION: ICD-10-CM

## 2025-05-05 DIAGNOSIS — N13.8 BENIGN PROSTATIC HYPERPLASIA WITH URINARY OBSTRUCTION: ICD-10-CM

## 2025-05-05 DIAGNOSIS — K21.9 GASTROESOPHAGEAL REFLUX DISEASE WITHOUT ESOPHAGITIS: ICD-10-CM

## 2025-05-05 DIAGNOSIS — Z12.5 PROSTATE CANCER SCREENING: ICD-10-CM

## 2025-05-05 DIAGNOSIS — E55.9 VITAMIN D DEFICIENCY: ICD-10-CM

## 2025-05-05 DIAGNOSIS — M54.10 RADICULOPATHY OF ARM: ICD-10-CM

## 2025-05-05 DIAGNOSIS — Z11.59 NEED FOR HEPATITIS C SCREENING TEST: ICD-10-CM

## 2025-05-05 DIAGNOSIS — I10 PRIMARY HYPERTENSION: ICD-10-CM

## 2025-05-05 DIAGNOSIS — Z78.9 NEVER SMOKED ANY SUBSTANCE: ICD-10-CM

## 2025-05-05 PROCEDURE — 1170F FXNL STATUS ASSESSED: CPT | Performed by: INTERNAL MEDICINE

## 2025-05-05 PROCEDURE — 3078F DIAST BP <80 MM HG: CPT | Performed by: INTERNAL MEDICINE

## 2025-05-05 PROCEDURE — 1158F ADVNC CARE PLAN TLK DOCD: CPT | Performed by: INTERNAL MEDICINE

## 2025-05-05 PROCEDURE — 99397 PER PM REEVAL EST PAT 65+ YR: CPT | Performed by: INTERNAL MEDICINE

## 2025-05-05 PROCEDURE — 3077F SYST BP >= 140 MM HG: CPT | Performed by: INTERNAL MEDICINE

## 2025-05-05 PROCEDURE — 1160F RVW MEDS BY RX/DR IN RCRD: CPT | Performed by: INTERNAL MEDICINE

## 2025-05-05 PROCEDURE — 1036F TOBACCO NON-USER: CPT | Performed by: INTERNAL MEDICINE

## 2025-05-05 PROCEDURE — G0439 PPPS, SUBSEQ VISIT: HCPCS | Performed by: INTERNAL MEDICINE

## 2025-05-05 PROCEDURE — 3008F BODY MASS INDEX DOCD: CPT | Performed by: INTERNAL MEDICINE

## 2025-05-05 PROCEDURE — 1159F MED LIST DOCD IN RCRD: CPT | Performed by: INTERNAL MEDICINE

## 2025-05-05 PROCEDURE — 1123F ACP DISCUSS/DSCN MKR DOCD: CPT | Performed by: INTERNAL MEDICINE

## 2025-05-05 PROCEDURE — 99214 OFFICE O/P EST MOD 30 MIN: CPT | Performed by: INTERNAL MEDICINE

## 2025-05-05 ASSESSMENT — ENCOUNTER SYMPTOMS
SORE THROAT: 0
LIGHT-HEADEDNESS: 0
CHILLS: 0
COUGH: 0
DYSURIA: 0
BLOOD IN STOOL: 0
HEMATURIA: 0
SHORTNESS OF BREATH: 0
PALPITATIONS: 0
CONSTIPATION: 0
CONFUSION: 0
VOMITING: 0
DIZZINESS: 0
ABDOMINAL PAIN: 1
DIARRHEA: 0
NAUSEA: 0
FEVER: 0
AGITATION: 0

## 2025-05-05 ASSESSMENT — ACTIVITIES OF DAILY LIVING (ADL)
DRESSING: INDEPENDENT
BATHING: INDEPENDENT
MANAGING_FINANCES: INDEPENDENT
GROCERY_SHOPPING: INDEPENDENT
DOING_HOUSEWORK: INDEPENDENT
TAKING_MEDICATION: INDEPENDENT

## 2025-05-05 NOTE — PROGRESS NOTES
Subjective   Reason for Visit: Patricio Mae is an 74 y.o. male here for a Medicare Wellness visit.          Reviewed all medications by prescribing practitioner or clinical pharmacist (such as prescriptions, OTCs, herbal therapies and supplements) and documented in the medical record.    History of Present Illness  Patricio Mea is a 74 year old male with hypertension and coronary artery disease who presents for a wellness test and routine follow-up.    His blood pressure today was 156/75, which is higher than usual. He is on lisinopril and amlodipine for hypertension. He does not regularly check his blood pressure at home but recalls a previous reading of 130/72 in February. He has a history of orthostatic hypotension, causing dizziness upon standing.  I did review his last cardiology note and they had advised not to adjust his blood pressure medicine and avoid aggressive blood pressure management due to his orthostatic hypotension.    He has coronary artery disease with plaque in the arteries but no critical blockages as indicated by a normal stress test. He is on cholesterol medication, which was recently increased to 20 mg rosuvastatin. he experiences occasional chest discomfort when bending down, which he describes as more muscular than cardiac.    He has a history of thyroid issues and is on 25 mcg of levothyroxine, which he takes first thing in the morning. He has experienced low vitamin D levels in the past.    He reports neuropathy but denies any sugar-related issues. He has a history of high bilirubin levels, which has been stable for a long time.  His baseline bilirubin is close to 1.6.    He experiences bloating and sharp pain in the right side of his abdomen, which comes and goes. He had his gallbladder removed in the 1980s. He has a history of acid reflux and takes over-the-counter antacids occasionally.  Denies any nausea, vomiting, diarrhea, blood in the stool.  He is up-to-date on colonoscopy.    He  "has multiple lipomas  that have been present for almost a year. They are not painful but are noticeable.  They are freely movable.  They are not hard.    He experiences numbness and tingling in his left arm, which he attributes to neck issues. He has a history of C4-C5 and lower lumbar problems and has been given exercises to manage these symptoms.  He was told by his orthopedic specialist that they may consider pain management referral if his symptoms persist despite physical therapy.    No family history of colon or prostate problems but there is a family history of heart issues.    Patient up-to-date on age and gender recommended screening but due for blood work.  Patient up-to-date on age and gender recommend immunizations.    Patient Care Team:  Meir Dodson DO as PCP - General (Internal Medicine)  Mier Dodson DO as PCP - [employer] PCP  Brittani Long MD as Cardiologist (Cardiology)     Review of Systems   Constitutional:  Negative for chills and fever.   HENT:  Negative for sore throat.    Eyes:  Negative for visual disturbance.   Respiratory:  Negative for cough and shortness of breath.    Cardiovascular:  Negative for chest pain, palpitations and leg swelling.   Gastrointestinal:  Positive for abdominal pain (Right-sided intermittent). Negative for blood in stool, constipation, diarrhea, nausea and vomiting.   Genitourinary:  Negative for dysuria and hematuria.   Skin:  Negative for rash.   Neurological:  Negative for dizziness, syncope and light-headedness.   Psychiatric/Behavioral:  Negative for agitation and confusion.        Objective   Vitals:  /75 (BP Location: Right arm, Patient Position: Sitting, BP Cuff Size: Adult)   Pulse 64   Temp 36.2 °C (97.2 °F)   Ht 1.549 m (5' 1\")   Wt 109 kg (240 lb 3.2 oz)   BMI 45.39 kg/m²       Physical Exam  VITALS: BP- 156/75  ABDOMEN: Abdomen normal.  Physical Exam  Vitals and nursing note reviewed.   Constitutional:       " General: He is not in acute distress.     Appearance: Normal appearance. He is obese. He is not ill-appearing, toxic-appearing or diaphoretic.   HENT:      Head: Normocephalic and atraumatic.      Mouth/Throat:      Mouth: Mucous membranes are moist.      Pharynx: Oropharynx is clear. No oropharyngeal exudate.   Eyes:      Extraocular Movements: Extraocular movements intact.      Pupils: Pupils are equal, round, and reactive to light.   Cardiovascular:      Rate and Rhythm: Normal rate and regular rhythm.      Heart sounds: Normal heart sounds. No murmur heard.  Pulmonary:      Effort: Pulmonary effort is normal. No respiratory distress.      Breath sounds: Normal breath sounds. No wheezing or rhonchi.   Abdominal:      General: Bowel sounds are normal. There is no distension.      Palpations: Abdomen is soft. There is no mass.      Tenderness: There is no abdominal tenderness. There is no guarding or rebound.      Hernia: No hernia is present.   Musculoskeletal:      Cervical back: Neck supple.      Right lower leg: No edema.      Left lower leg: No edema.      Comments: Probable soft tissue mass is freely movable approximately 5 mm to 1 cm on the bilateral forearms   Lymphadenopathy:      Cervical: No cervical adenopathy.   Skin:     General: Skin is warm and dry.      Coloration: Skin is not jaundiced or pale.      Findings: No rash.   Neurological:      General: No focal deficit present.      Mental Status: He is alert and oriented to person, place, and time.      Cranial Nerves: No cranial nerve deficit.   Psychiatric:         Mood and Affect: Mood normal.         Behavior: Behavior normal.         Thought Content: Thought content normal.         Judgment: Judgment normal.       Assessment & Plan  Hypertension  Blood pressure elevated at 156/75 mmHg. Orthostatic hypotension present, advised to avoid aggressive management. Currently on lisinopril and amlodipine. Previous blood pressure was 130/72 mmHg. Decision  to maintain current regimen to avoid orthostatic hypotension.  - Continue current antihypertensive regimen    Hypothyroidism  Well-managed on levothyroxine 25 mcg. No new symptoms reported.  - Continue levothyroxine 25 mcg daily  - Order thyroid function tests    Gastroesophageal reflux disease (GERD)  Intermittent bloating and sharp abdominal pain.  No nausea or vomiting. Symptoms possibly related to diet or scar tissue from previous cholecystectomy.  - Recommend over-the-counter omeprazole  - Consider low FODMAP diet  - If acute episode occurs to follow-up in the office immediately for evaluation    Cervical radiculopathy  Intermittent numbness and tingling in the left arm, likely related to cervical spine issues. History of C4-C5 and lumbar spine problems. Currently performing neck exercises.  - Continue neck exercises  - Consider referral to pain management if symptoms persist    High CT calcium score  Presence of plaque in coronary arteries but no critical blockage. Stress test normal. On cholesterol medication to prevent plaque buildup. Decision to maintain current cholesterol regimen.  - Continue current cholesterol medication regimen    Cholecystectomy  Cholecystectomy performed in the 1980s. Possible scar tissue causing intermittent abdominal pain.  - Consider abdominal ultrasound or CT abdomen pelvis if pain persists    Lipomas  Presence of lipomas on the body, not causing significant discomfort. Freely movable and likely benign.  - Monitor lipomas for changes in size or pain  - Consider surgical removal if he becomes bothersome    Hyperlipidemia: Will check a lipid panel recently cholesterol medication was increased with rosuvastatin.    Prediabetes: Check hemoglobin A1c    Cervical spine pain/radiculopathy of arm: He will continue on gabapentin again with physical therapy as discussed.    Parkinson's disease: Chronic, stable patient on carbidopa levodopa [facility]    BPH:  Chronic, stable patient on finasteride and tamsulosin managed by urology    Wellness Visit  Routine wellness visit. Blood pressure slightly elevated at 156/75 mmHg. Immunizations up to date. Colonoscopy due in April 2026. No family history of colon or prostate cancer. No smoking or alcohol use.  - Order PSA test  - Order lab work including thyroid and vitamin D levels  - Schedule colonoscopy for April 2026  - Patient up-to-date on age and gender recommended screening exception of blood work has been ordered.  Patient up-to-date on age and gender recommended immunizations    Assessment & Plan  Vitamin D deficiency    Orders:    Vitamin D 25-Hydroxy,Total (for eval of Vitamin D levels); Future    Primary hypertension    Orders:    CBC and Auto Differential; Future    Comprehensive Metabolic Panel; Future    Mixed hyperlipidemia    Orders:    CBC and Auto Differential; Future    Comprehensive Metabolic Panel; Future    Lipid Panel; Future    Prediabetes    Orders:    Hemoglobin A1C; Future    Hypothyroidism due to Hashimoto's thyroiditis    Orders:    TSH with reflex to Free T4 if abnormal; Future    Prostate cancer screening    Orders:    Prostate Specific Antigen, Screen; Future    Need for hepatitis C screening test    Orders:    Hepatitis C antibody; Future    Other problems related to lifestyle    Orders:    Hepatitis C antibody; Future    Never smoked any substance         Gastroesophageal reflux disease without esophagitis         Health maintenance examination         Parkinson's disease, unspecified whether dyskinesia present, unspecified whether manifestations fluctuate         History of orthostatic hypotension         Benign prostatic hyperplasia with urinary obstruction         Cervical spine pain         Radiculopathy of arm                   Meir Dodson DO     This medical note was created with the assistance of artificial intelligence (AI) for documentation purposes. The content has been reviewed  and confirmed by the healthcare provider for accuracy and completeness. Patient consented to the use of audio recording and use of AI during their visit.

## 2025-05-12 ENCOUNTER — APPOINTMENT (OUTPATIENT)
Dept: UROLOGY | Facility: CLINIC | Age: 75
End: 2025-05-12
Payer: MEDICARE

## 2025-05-12 VITALS — HEART RATE: 51 BPM | SYSTOLIC BLOOD PRESSURE: 156 MMHG | TEMPERATURE: 97.9 F | DIASTOLIC BLOOD PRESSURE: 89 MMHG

## 2025-05-12 DIAGNOSIS — N32.81 OVERACTIVE BLADDER: ICD-10-CM

## 2025-05-12 DIAGNOSIS — N40.1 BPH WITH OBSTRUCTION/LOWER URINARY TRACT SYMPTOMS: Primary | ICD-10-CM

## 2025-05-12 DIAGNOSIS — N13.8 BPH WITH OBSTRUCTION/LOWER URINARY TRACT SYMPTOMS: Primary | ICD-10-CM

## 2025-05-12 PROCEDURE — 3079F DIAST BP 80-89 MM HG: CPT | Performed by: UROLOGY

## 2025-05-12 PROCEDURE — G2211 COMPLEX E/M VISIT ADD ON: HCPCS | Performed by: UROLOGY

## 2025-05-12 PROCEDURE — 1159F MED LIST DOCD IN RCRD: CPT | Performed by: UROLOGY

## 2025-05-12 PROCEDURE — 1036F TOBACCO NON-USER: CPT | Performed by: UROLOGY

## 2025-05-12 PROCEDURE — 3077F SYST BP >= 140 MM HG: CPT | Performed by: UROLOGY

## 2025-05-12 PROCEDURE — 99214 OFFICE O/P EST MOD 30 MIN: CPT | Performed by: UROLOGY

## 2025-05-12 PROCEDURE — 1160F RVW MEDS BY RX/DR IN RCRD: CPT | Performed by: UROLOGY

## 2025-05-12 RX ORDER — VIBEGRON 75 MG/1
75 TABLET, FILM COATED ORAL DAILY
Qty: 28 TABLET | Refills: 0 | Status: SHIPPED | OUTPATIENT
Start: 2025-05-12

## 2025-05-12 NOTE — PROGRESS NOTES
PAST UROLOGICAL HISTORY:  74-year-old man with history of BPH who underwent UroLift in 2019. Last seen by Luz Marina Hsieh CNP in 2020. In May 2024, PSA was 3.28, and tamsulosin was increased to twice daily with finasteride 5 mg daily added. In November 2024, he reported some improvement in frequency and stream with no straining or hematuria. Post-void residual was 36 mL with IPSS 15 and QOL 3/6.    I have independently reviewed and interpreted the patient's prior urological history, medication regimen, and urinary symptoms as noted herein.    HPI TODAY 05/12/2025:    Benign Prostatic Hyperplasia (BPH):  - Mr. Mae presents for follow-up of BPH and lower urinary tract symptoms.  - Reports persistent symptoms including sensation of incomplete bladder emptying, urinary frequency, occasional hesitancy, weak stream, intermittency, straining, urgency, and post-void dribbling.  - Continues to experience nocturia 4 times per night despite medication adjustments.  - Currently taking tamsulosin 0.8 mg nightly (switched from 0.4 mg BID in November 2024 due to lightheadedness) and finasteride 5 mg daily (restarted in November 2024).  - Reports the medication change to nighttime dosing has helped with lightheadedness.  - No improvement in urinary symptoms despite current medication regimen.    PSA History:  - 05/2024: 3.28    PAST MEDICAL HISTORY:  - Hypertension  - Hyperlipidemia  - Claudication  - Thrombocytopenia  - BMI 35  - Type 2 diabetes  - Depression  - Parkinson's disease (on levodopa/carbidopa)  - Sleep apnea (uses CPAP)    REVIEW OF SYSTEMS: A tailored review of systems was performed and all pertinent positives and negatives are listed in the HPI.    PHYSICAL EXAMINATION:  Gen: NAD  Pulm: No increased WOB on RA  Cards: WWP    ASSESSMENT/PLAN:    Benign Prostatic Hyperplasia (N40.0)  - Assessment: 74-year-old male with persistent moderate to severe lower urinary tract symptoms despite UroLift in 2019 and current  medical therapy with tamsulosin and finasteride. Symptoms include incomplete emptying, frequency, nocturia (4x/night), weak stream, and urgency. Last PVR was 36 mL, indicating adequate emptying.  - Plan:    - Discontinue finasteride 5 mg daily as no apparent benefit    - Start Gemtessa (mirabegron) 75 mg daily for overactive bladder symptoms    - Provided 30-day sample of Gemtessa    - Continue tamsulosin 0.8 mg nightly    - Schedule cystoscopy and transrectal ultrasound at [facility] office to evaluate prostate size and determine if additional surgical intervention is warranted    - If Gemtessa is effective but cost prohibitive (>$100 for 3-month supply), will arrange for [facility] pharmacy option  - Counseling: Discussed that both Parkinson's disease and BPH may be contributing to his urinary symptoms. Parkinson's can cause overactive bladder symptoms with increased urgency and frequency even with small volumes, while BPH causes obstructive symptoms. Mirabegron targets the overactive bladder component and may help with frequency and nocturia. Discussed potential future surgical options including TURP and laser enucleation depending on prostate size assessment.

## 2025-05-13 DIAGNOSIS — I10 PRIMARY HYPERTENSION: ICD-10-CM

## 2025-05-13 LAB
25(OH)D3+25(OH)D2 SERPL-MCNC: 37 NG/ML (ref 30–100)
ALBUMIN SERPL-MCNC: 4.3 G/DL (ref 3.6–5.1)
ALP SERPL-CCNC: 85 U/L (ref 35–144)
ALT SERPL-CCNC: 12 U/L (ref 9–46)
ANION GAP SERPL CALCULATED.4IONS-SCNC: 6 MMOL/L (CALC) (ref 7–17)
AST SERPL-CCNC: 22 U/L (ref 10–35)
BASOPHILS # BLD AUTO: 12 CELLS/UL (ref 0–200)
BASOPHILS NFR BLD AUTO: 0.2 %
BILIRUB SERPL-MCNC: 1.6 MG/DL (ref 0.2–1.2)
BUN SERPL-MCNC: 12 MG/DL (ref 7–25)
CALCIUM SERPL-MCNC: 9.5 MG/DL (ref 8.6–10.3)
CHLORIDE SERPL-SCNC: 103 MMOL/L (ref 98–110)
CHOLEST SERPL-MCNC: 140 MG/DL
CHOLEST/HDLC SERPL: 3.3 (CALC)
CO2 SERPL-SCNC: 29 MMOL/L (ref 20–32)
CREAT SERPL-MCNC: 0.75 MG/DL (ref 0.7–1.28)
EGFRCR SERPLBLD CKD-EPI 2021: 95 ML/MIN/1.73M2
EOSINOPHIL # BLD AUTO: 81 CELLS/UL (ref 15–500)
EOSINOPHIL NFR BLD AUTO: 1.3 %
ERYTHROCYTE [DISTWIDTH] IN BLOOD BY AUTOMATED COUNT: 14.4 % (ref 11–15)
EST. AVERAGE GLUCOSE BLD GHB EST-MCNC: 114 MG/DL
EST. AVERAGE GLUCOSE BLD GHB EST-SCNC: 6.3 MMOL/L
GLUCOSE SERPL-MCNC: 95 MG/DL (ref 65–99)
HBA1C MFR BLD: 5.6 %
HCT VFR BLD AUTO: 47.5 % (ref 38.5–50)
HCV AB SERPL QL IA: NORMAL
HDLC SERPL-MCNC: 42 MG/DL
HGB BLD-MCNC: 15.2 G/DL (ref 13.2–17.1)
LDLC SERPL CALC-MCNC: 76 MG/DL (CALC)
LYMPHOCYTES # BLD AUTO: 1786 CELLS/UL (ref 850–3900)
LYMPHOCYTES NFR BLD AUTO: 28.8 %
MCH RBC QN AUTO: 29.9 PG (ref 27–33)
MCHC RBC AUTO-ENTMCNC: 32 G/DL (ref 32–36)
MCV RBC AUTO: 93.3 FL (ref 80–100)
MONOCYTES # BLD AUTO: 459 CELLS/UL (ref 200–950)
MONOCYTES NFR BLD AUTO: 7.4 %
NEUTROPHILS # BLD AUTO: 3863 CELLS/UL (ref 1500–7800)
NEUTROPHILS NFR BLD AUTO: 62.3 %
NONHDLC SERPL-MCNC: 98 MG/DL (CALC)
PLATELET # BLD AUTO: 178 THOUSAND/UL (ref 140–400)
PMV BLD REES-ECKER: 9.9 FL (ref 7.5–12.5)
POTASSIUM SERPL-SCNC: 4.5 MMOL/L (ref 3.5–5.3)
PROT SERPL-MCNC: 6.9 G/DL (ref 6.1–8.1)
PSA SERPL-MCNC: 1.58 NG/ML
RBC # BLD AUTO: 5.09 MILLION/UL (ref 4.2–5.8)
SODIUM SERPL-SCNC: 138 MMOL/L (ref 135–146)
TRIGL SERPL-MCNC: 139 MG/DL
TSH SERPL-ACNC: 2.2 MIU/L (ref 0.4–4.5)
WBC # BLD AUTO: 6.2 THOUSAND/UL (ref 3.8–10.8)

## 2025-05-15 RX ORDER — LISINOPRIL 20 MG/1
20 TABLET ORAL DAILY
Qty: 90 TABLET | Refills: 1 | Status: SHIPPED | OUTPATIENT
Start: 2025-05-15

## 2025-05-21 ENCOUNTER — TELEPHONE (OUTPATIENT)
Dept: UROLOGY | Facility: CLINIC | Age: 75
End: 2025-05-21
Payer: MEDICARE

## 2025-05-21 NOTE — TELEPHONE ENCOUNTER
Wife calling saying that Dr. Tarango gave him Gemtessa and its very expensive but [facility] Trosp[facility].  Wife would like to discuss.  Please advise

## 2025-07-24 ENCOUNTER — APPOINTMENT (OUTPATIENT)
Dept: UROLOGY | Facility: CLINIC | Age: 75
End: 2025-07-24
Payer: MEDICARE

## 2025-07-24 VITALS — TEMPERATURE: 98.1 F | HEART RATE: 46 BPM | SYSTOLIC BLOOD PRESSURE: 136 MMHG | DIASTOLIC BLOOD PRESSURE: 79 MMHG

## 2025-07-24 DIAGNOSIS — N40.1 BPH WITH OBSTRUCTION/LOWER URINARY TRACT SYMPTOMS: ICD-10-CM

## 2025-07-24 DIAGNOSIS — N13.8 BPH WITH OBSTRUCTION/LOWER URINARY TRACT SYMPTOMS: ICD-10-CM

## 2025-07-24 PROCEDURE — 99214 OFFICE O/P EST MOD 30 MIN: CPT | Mod: 25 | Performed by: UROLOGY

## 2025-07-24 PROCEDURE — 76872 US TRANSRECTAL: CPT | Performed by: UROLOGY

## 2025-07-24 PROCEDURE — 99214 OFFICE O/P EST MOD 30 MIN: CPT | Performed by: UROLOGY

## 2025-07-24 PROCEDURE — 52000 CYSTOURETHROSCOPY: CPT | Performed by: UROLOGY

## 2025-07-24 RX ORDER — TROSPIUM CHLORIDE 20 MG/1
20 TABLET, FILM COATED ORAL
COMMUNITY
Start: 2025-05-23

## 2025-07-24 NOTE — PROGRESS NOTES
PAST UROLOGICAL HISTORY:  Mr. Mae is a 74-year-old man with a history of BPH, who underwent a UroLift procedure in 2019. He was last seen by a nurse practitioner in 2020. In May 2024, his PSA was 3.28, and medication was adjusted to tamsulosin twice daily and finasteride 5 mg daily was added. By November 2024, he reported some improvement with an IPSS of 15 and a post-void residual of 36 mL.    I have independently reviewed and interpreted the patient's prior urological history, medication regimen, and urinary symptoms as noted herein.    HPI TODAY 07/24/2025:  Benign Prostatic Hyperplasia (BPH):  - Mr. Mae presents for follow-up of BPH and lower urinary tract symptoms. At his last visit in May, finasteride was stopped and he was started on Gemtesa 75 mg daily for OAB, which he reports helps a little. He continues to experience persistent symptoms including a sensation of incomplete bladder emptying, urinary frequency, occasional hesitancy, a weak stream, intermittency, straining, urgency, and post-void dribbling. He reports nocturia 4 times per night. He currently takes tamsulosin 0.8 mg nightly and has restarted finasteride 5 mg daily.    PSA History:  - 05/2024: 3.28  - 05/12/2025: 1.58  PSA Density: 0.026    PAST MEDICAL HISTORY:  - HTN  - Hyperlipidemia  - Claudication  - Thrombocytopenia  - BMI 35  - T2DM  - Depression  - Parkinson's disease (on levodopa/carbidopa)  - Sleep apnea (uses CPAP)    REVIEW OF SYSTEMS: A tailored review of systems was performed and all pertinent positives and negatives are listed in the HPI.    PHYSICAL EXAMINATION:  Gen: NAD  Pulm: No increased WOB on RA  Cards: WWP  Patient ID: Patricio Mae is a 74 y.o. male.    Cystoscopy    Date/Time: 7/24/2025 4:16 PM    Performed by: Jarrett Tarango MD  Authorized by: Jarrett Tarango MD      Comments:      Cystoscopy revealed a markedly enlarged prostate with overgrowth of his Urolift tams and obstructing lateral lobes. There was a  mound-like protrusion of the prostate into the bladder. The bladder was not significantly trabeculated. The prostate was noted to be inflamed and bled with contact. No scar tissue was seen in the urethra.'    Transrectal ultrasound showed a prostate volume of 60 mL.          ASSESSMENT/PLAN:  Benign Prostatic Hyperplasia with LUTS (N40.1)  - Assessment: Symptoms are refractory to medical management. The prostate has overgrown the Urolift implants.  - Plan:  - Referral to Dr. Urbano for Holmium Laser Enucleation of the Prostate (HoLEP).  - Counseling: Risks, benefits, and alternatives were discussed including but not limited to permanent urinary incontinence, urinary tract infection, and bleeding. Discussed that HoLEP is an outpatient surgery performed under general anesthesia, requiring a catheter for 3-5 days post-operatively.    Microscopic Hematuria (R31.9)  - Assessment: Likely secondary to enlarged, inflamed prostate.  - Plan:  - Will be addressed with HoLEP.

## 2025-07-25 DIAGNOSIS — I10 PRIMARY HYPERTENSION: ICD-10-CM

## 2025-07-29 DIAGNOSIS — I10 PRIMARY HYPERTENSION: ICD-10-CM

## 2025-07-30 RX ORDER — POTASSIUM CHLORIDE 20 MEQ/1
20 TABLET, EXTENDED RELEASE ORAL DAILY
Qty: 90 TABLET | Refills: 1 | Status: SHIPPED | OUTPATIENT
Start: 2025-07-30

## 2025-07-30 RX ORDER — POTASSIUM CHLORIDE 20 MEQ/1
20 TABLET, EXTENDED RELEASE ORAL DAILY
Qty: 90 TABLET | Refills: 1 | OUTPATIENT
Start: 2025-07-30

## 2025-11-05 ENCOUNTER — APPOINTMENT (OUTPATIENT)
Dept: PRIMARY CARE | Facility: CLINIC | Age: 75
End: 2025-11-05
Payer: MEDICARE

## 2026-02-26 ENCOUNTER — APPOINTMENT (OUTPATIENT)
Dept: CARDIOLOGY | Facility: CLINIC | Age: 76
End: 2026-02-26
Payer: MEDICARE